# Patient Record
Sex: MALE | Race: WHITE | Employment: FULL TIME | URBAN - METROPOLITAN AREA
[De-identification: names, ages, dates, MRNs, and addresses within clinical notes are randomized per-mention and may not be internally consistent; named-entity substitution may affect disease eponyms.]

---

## 2017-01-09 ENCOUNTER — TELEPHONE (OUTPATIENT)
Dept: FAMILY MEDICINE CLINIC | Age: 52
End: 2017-01-09

## 2017-01-12 ENCOUNTER — TELEPHONE (OUTPATIENT)
Dept: FAMILY MEDICINE CLINIC | Age: 52
End: 2017-01-12

## 2017-06-13 ENCOUNTER — TELEPHONE (OUTPATIENT)
Dept: FAMILY MEDICINE CLINIC | Age: 52
End: 2017-06-13

## 2017-06-15 LAB
CHOLESTEROL, TOTAL: 218 MG/DL
CHOLESTEROL/HDL RATIO: NORMAL
HDLC SERPL-MCNC: 45 MG/DL (ref 35–70)
LDL CHOLESTEROL CALCULATED: 114 MG/DL (ref 0–160)
TRIGL SERPL-MCNC: 296 MG/DL
VLDLC SERPL CALC-MCNC: 59 MG/DL

## 2017-06-20 ENCOUNTER — OFFICE VISIT (OUTPATIENT)
Dept: FAMILY MEDICINE CLINIC | Age: 52
End: 2017-06-20

## 2017-06-20 VITALS
HEIGHT: 70 IN | BODY MASS INDEX: 31.7 KG/M2 | HEART RATE: 68 BPM | DIASTOLIC BLOOD PRESSURE: 70 MMHG | SYSTOLIC BLOOD PRESSURE: 124 MMHG | RESPIRATION RATE: 16 BRPM | WEIGHT: 221.4 LBS

## 2017-06-20 DIAGNOSIS — N52.9 ERECTILE DYSFUNCTION, UNSPECIFIED ERECTILE DYSFUNCTION TYPE: ICD-10-CM

## 2017-06-20 DIAGNOSIS — E66.9 OBESITY, UNSPECIFIED OBESITY SEVERITY, UNSPECIFIED OBESITY TYPE: ICD-10-CM

## 2017-06-20 DIAGNOSIS — Z80.42 FAMILY HISTORY OF PROSTATE CANCER: ICD-10-CM

## 2017-06-20 DIAGNOSIS — E78.5 HYPERLIPIDEMIA, UNSPECIFIED HYPERLIPIDEMIA TYPE: Primary | ICD-10-CM

## 2017-06-20 DIAGNOSIS — Z12.11 SCREEN FOR COLON CANCER: ICD-10-CM

## 2017-06-20 PROCEDURE — 99214 OFFICE O/P EST MOD 30 MIN: CPT | Performed by: FAMILY MEDICINE

## 2017-06-20 RX ORDER — SILDENAFIL CITRATE 20 MG/1
20 TABLET ORAL PRN
Qty: 30 TABLET | Refills: 5 | Status: SHIPPED | OUTPATIENT
Start: 2017-06-20 | End: 2018-06-15 | Stop reason: SDUPTHER

## 2017-06-20 ASSESSMENT — ENCOUNTER SYMPTOMS
RESPIRATORY NEGATIVE: 1
GASTROINTESTINAL NEGATIVE: 1

## 2018-06-04 ENCOUNTER — TELEPHONE (OUTPATIENT)
Dept: FAMILY MEDICINE CLINIC | Age: 53
End: 2018-06-04

## 2018-06-04 DIAGNOSIS — Z00.00 WELL ADULT EXAM: Primary | ICD-10-CM

## 2018-06-12 LAB
AVERAGE GLUCOSE: NORMAL
CHOLESTEROL, TOTAL: 262 MG/DL
CHOLESTEROL/HDL RATIO: NORMAL
HBA1C MFR BLD: 5.4 %
HDLC SERPL-MCNC: 39 MG/DL (ref 35–70)
LDL CHOLESTEROL CALCULATED: NORMAL MG/DL (ref 0–160)
TRIGL SERPL-MCNC: 621 MG/DL
VLDLC SERPL CALC-MCNC: NORMAL MG/DL

## 2018-06-15 ENCOUNTER — OFFICE VISIT (OUTPATIENT)
Dept: FAMILY MEDICINE CLINIC | Age: 53
End: 2018-06-15
Payer: COMMERCIAL

## 2018-06-15 VITALS
HEIGHT: 70 IN | RESPIRATION RATE: 14 BRPM | DIASTOLIC BLOOD PRESSURE: 88 MMHG | BODY MASS INDEX: 31.64 KG/M2 | WEIGHT: 221 LBS | OXYGEN SATURATION: 98 % | HEART RATE: 84 BPM | SYSTOLIC BLOOD PRESSURE: 138 MMHG

## 2018-06-15 DIAGNOSIS — I10 ESSENTIAL HYPERTENSION: Primary | ICD-10-CM

## 2018-06-15 DIAGNOSIS — N52.9 ERECTILE DYSFUNCTION, UNSPECIFIED ERECTILE DYSFUNCTION TYPE: ICD-10-CM

## 2018-06-15 DIAGNOSIS — E66.9 OBESITY (BMI 30.0-34.9): ICD-10-CM

## 2018-06-15 DIAGNOSIS — Z80.42 FAMILY HISTORY OF PROSTATE CANCER: ICD-10-CM

## 2018-06-15 DIAGNOSIS — E78.49 OTHER HYPERLIPIDEMIA: ICD-10-CM

## 2018-06-15 PROCEDURE — 99214 OFFICE O/P EST MOD 30 MIN: CPT | Performed by: FAMILY MEDICINE

## 2018-06-15 RX ORDER — SILDENAFIL CITRATE 20 MG/1
20 TABLET ORAL PRN
Qty: 30 TABLET | Refills: 5 | Status: SHIPPED | OUTPATIENT
Start: 2018-06-15 | End: 2018-12-17 | Stop reason: SDUPTHER

## 2018-06-15 ASSESSMENT — PATIENT HEALTH QUESTIONNAIRE - PHQ9
2. FEELING DOWN, DEPRESSED OR HOPELESS: 0
1. LITTLE INTEREST OR PLEASURE IN DOING THINGS: 0
SUM OF ALL RESPONSES TO PHQ QUESTIONS 1-9: 0
SUM OF ALL RESPONSES TO PHQ9 QUESTIONS 1 & 2: 0

## 2018-06-15 ASSESSMENT — ENCOUNTER SYMPTOMS
GASTROINTESTINAL NEGATIVE: 1
RESPIRATORY NEGATIVE: 1

## 2018-12-11 LAB
CHOLESTEROL, TOTAL: 233 MG/DL
CHOLESTEROL/HDL RATIO: NORMAL
HDLC SERPL-MCNC: 50 MG/DL (ref 35–70)
LDL CHOLESTEROL CALCULATED: 145 MG/DL (ref 0–160)
TRIGL SERPL-MCNC: 188 MG/DL
VLDLC SERPL CALC-MCNC: 38 MG/DL

## 2018-12-17 ENCOUNTER — OFFICE VISIT (OUTPATIENT)
Dept: FAMILY MEDICINE CLINIC | Age: 53
End: 2018-12-17
Payer: COMMERCIAL

## 2018-12-17 VITALS
BODY MASS INDEX: 32.97 KG/M2 | SYSTOLIC BLOOD PRESSURE: 122 MMHG | HEIGHT: 69 IN | RESPIRATION RATE: 16 BRPM | HEART RATE: 84 BPM | DIASTOLIC BLOOD PRESSURE: 84 MMHG | WEIGHT: 222.6 LBS

## 2018-12-17 DIAGNOSIS — R73.01 IFG (IMPAIRED FASTING GLUCOSE): ICD-10-CM

## 2018-12-17 DIAGNOSIS — E66.9 OBESITY (BMI 30-39.9): ICD-10-CM

## 2018-12-17 DIAGNOSIS — N52.9 ERECTILE DYSFUNCTION, UNSPECIFIED ERECTILE DYSFUNCTION TYPE: ICD-10-CM

## 2018-12-17 DIAGNOSIS — Z80.42 FAMILY HISTORY OF PROSTATE CANCER: ICD-10-CM

## 2018-12-17 DIAGNOSIS — E78.00 PURE HYPERCHOLESTEROLEMIA: Primary | ICD-10-CM

## 2018-12-17 PROCEDURE — 99214 OFFICE O/P EST MOD 30 MIN: CPT | Performed by: FAMILY MEDICINE

## 2018-12-17 RX ORDER — SILDENAFIL CITRATE 20 MG/1
20 TABLET ORAL PRN
Qty: 30 TABLET | Refills: 5 | Status: SHIPPED | OUTPATIENT
Start: 2018-12-17 | End: 2019-06-17 | Stop reason: SDUPTHER

## 2018-12-17 ASSESSMENT — ENCOUNTER SYMPTOMS
GASTROINTESTINAL NEGATIVE: 1
RESPIRATORY NEGATIVE: 1

## 2018-12-17 NOTE — PROGRESS NOTES
Chronic Disease Visit Information    BP Readings from Last 3 Encounters:   06/15/18 138/88   07/11/17 134/76   06/20/17 124/70          Hemoglobin A1C (%)   Date Value   06/12/2018 5.4   12/15/2016 5.6   10/28/2015 5.7     LDL Calculated (mg/dL)   Date Value   12/11/2018 145     HDL   Date Value   12/11/2018 50 mg/dL   12/05/2011 48 mg/dl     BUN (mg/dl)   Date Value   01/25/2016 16     CREATININE (mg/dl)   Date Value   01/25/2016 0.9     Glucose (mg/dl)   Date Value   01/25/2016 96            Have you changed or started any medications since your last visit including any over-the-counter medicines, vitamins, or herbal medicines? no   Are you having any side effects from any of your medications? -  no  Have you stopped taking any of your medications? Is so, why? -  no    Have you seen any other physician or provider since your last visit? No  Have you had any other diagnostic tests since your last visit? Yes - Records Obtained  Have you been seen in the emergency room and/or had an admission to a hospital since we last saw you? No  Have you had your annual diabetic retinal (eye) exam? No  Have you had your routine dental cleaning in the past 6 months? no    Have you activated your [employer] account? If not, what are your barriers?  Yes     Patient Care Team:  Ann-Marie Cano DO as PCP - General (Family Medicine)  Ann-Marie Cano DO as PCP - [facility] Attributed Provider         Medical History Review  Past Medical, Family, and Social History reviewed and does contribute to the patient presenting condition    Health Maintenance   Topic Date Due    Hepatitis C screen  1965    HIV screen  05/03/1980    DTaP/Tdap/Td vaccine (1 - Tdap) 05/03/1984    Shingles Vaccine (1 of 2 - 2 Dose Series) 05/03/2015    Flu vaccine (1) 09/01/2018    Lipid screen  12/11/2023    Colon cancer screen colonoscopy  08/04/2027

## 2018-12-17 NOTE — PROGRESS NOTES
WBC 6.1 01/25/2016    HGB 14.1 01/25/2016    HCT 41.0 (L) 01/25/2016    MCV 84.4 01/25/2016     01/25/2016         Health Maintenance   Topic Date Due    Hepatitis C screen  1965    HIV screen  05/03/1980    DTaP/Tdap/Td vaccine (1 - Tdap) 05/03/1984    Shingles Vaccine (1 of 2 - 2 Dose Series) 05/03/2015    Flu vaccine (1) 09/01/2018    Lipid screen  12/11/2023    Colon cancer screen colonoscopy  08/04/2027         There is no immunization history on file for this patient. Review of Systems   Constitutional: Negative. HENT: Negative. Respiratory: Negative. Cardiovascular: Negative. Gastrointestinal: Negative. Musculoskeletal: Negative. All other systems reviewed and are negative. Objective:   Physical Exam   Constitutional: He is oriented to person, place, and time. He appears well-developed and well-nourished. HENT:   Head: Normocephalic and atraumatic. Right Ear: Tympanic membrane normal.   Left Ear: Tympanic membrane normal.   Mouth/Throat: Oropharynx is clear and moist and mucous membranes are normal.   Neck: Carotid bruit is not present. Cardiovascular: Normal rate, regular rhythm and normal heart sounds. No murmur heard. Pulmonary/Chest: Effort normal and breath sounds normal.   Abdominal: Soft. Bowel sounds are normal.   Musculoskeletal: He exhibits no edema. Neurological: He is alert and oriented to person, place, and time. Skin: Skin is warm and dry. Psychiatric: He has a normal mood and affect. His behavior is normal.   Nursing note and vitals reviewed. Assessment:       Diagnosis Orders   1. Pure hypercholesterolemia  Lipid Panel    Comprehensive Metabolic Panel    TSH with Reflex   2. Erectile dysfunction, unspecified erectile dysfunction type  sildenafil (REVATIO) 20 MG tablet   3. Obesity (BMI 30-39.9)     4. IFG (impaired fasting glucose)  Comprehensive Metabolic Panel    Hemoglobin A1C   5.  Family history of prostate cancer PSA, Prostatic Specific Antigen           Plan:      -  Chronic medical problems stable  -  Continue current medications  -  Labs reviewed, cholesterol has improved with dietary changes alone.   Will hold off on medication for now  -  Recheck labs 6 mos  -  RTO 6 mos        Gonzalo Clay DO

## 2019-06-11 ENCOUNTER — TELEPHONE (OUTPATIENT)
Dept: FAMILY MEDICINE CLINIC | Age: 54
End: 2019-06-11

## 2019-06-11 NOTE — TELEPHONE ENCOUNTER
Spoke with pt regarding his upcoming appt on 6/17/19 at 8:30am.  Confirmed appt and reminded pt to get labs done.

## 2019-06-12 LAB
AVERAGE GLUCOSE: NORMAL
CHOLESTEROL, TOTAL: 231 MG/DL
CHOLESTEROL/HDL RATIO: NORMAL
HBA1C MFR BLD: 5.5 %
HDLC SERPL-MCNC: 45 MG/DL (ref 35–70)
LDL CHOLESTEROL CALCULATED: 130 MG/DL (ref 0–160)
TRIGL SERPL-MCNC: 280 MG/DL
TSH SERPL DL<=0.05 MIU/L-ACNC: 1.92 UIU/ML
VLDLC SERPL CALC-MCNC: 56 MG/DL

## 2019-06-17 ENCOUNTER — OFFICE VISIT (OUTPATIENT)
Dept: FAMILY MEDICINE CLINIC | Age: 54
End: 2019-06-17
Payer: COMMERCIAL

## 2019-06-17 VITALS
SYSTOLIC BLOOD PRESSURE: 126 MMHG | BODY MASS INDEX: 30.48 KG/M2 | WEIGHT: 206.4 LBS | HEART RATE: 72 BPM | OXYGEN SATURATION: 99 % | RESPIRATION RATE: 12 BRPM | DIASTOLIC BLOOD PRESSURE: 84 MMHG

## 2019-06-17 DIAGNOSIS — E78.00 PURE HYPERCHOLESTEROLEMIA: ICD-10-CM

## 2019-06-17 DIAGNOSIS — E66.9 OBESITY (BMI 30-39.9): ICD-10-CM

## 2019-06-17 DIAGNOSIS — M72.0 DUPUYTREN CONTRACTURE: Primary | ICD-10-CM

## 2019-06-17 DIAGNOSIS — R73.01 IFG (IMPAIRED FASTING GLUCOSE): ICD-10-CM

## 2019-06-17 DIAGNOSIS — N52.9 ERECTILE DYSFUNCTION, UNSPECIFIED ERECTILE DYSFUNCTION TYPE: ICD-10-CM

## 2019-06-17 PROCEDURE — 99214 OFFICE O/P EST MOD 30 MIN: CPT | Performed by: FAMILY MEDICINE

## 2019-06-17 RX ORDER — SILDENAFIL CITRATE 20 MG/1
20 TABLET ORAL PRN
Qty: 30 TABLET | Refills: 5 | Status: SHIPPED | OUTPATIENT
Start: 2019-06-17 | End: 2019-12-17

## 2019-06-17 ASSESSMENT — PATIENT HEALTH QUESTIONNAIRE - PHQ9
SUM OF ALL RESPONSES TO PHQ QUESTIONS 1-9: 0
1. LITTLE INTEREST OR PLEASURE IN DOING THINGS: 0
2. FEELING DOWN, DEPRESSED OR HOPELESS: 0
SUM OF ALL RESPONSES TO PHQ QUESTIONS 1-9: 0
SUM OF ALL RESPONSES TO PHQ9 QUESTIONS 1 & 2: 0

## 2019-06-17 ASSESSMENT — ENCOUNTER SYMPTOMS
GASTROINTESTINAL NEGATIVE: 1
RESPIRATORY NEGATIVE: 1

## 2019-06-17 NOTE — PROGRESS NOTES
Visit Information    Have you changed or started any medications since your last visit including any over-the-counter medicines, vitamins, or herbal medicines? no   Are you having any side effects from any of your medications? -  no  Have you stopped taking any of your medications? Is so, why? -  no    Have you seen any other physician or provider since your last visit? No  Have you had any other diagnostic tests since your last visit? Yes - Records Obtained  Have you been seen in the emergency room and/or had an admission to a hospital since we last saw you? No  Have you activated your [employer] account? If not, what are your barriers?  Yes     Patient Care Team:  Venkata Solorzano DO as PCP - General (Family Medicine)  Venkata Solorzano DO as PCP - [facility]    Medical History Review  Past Medical, Family, and Social History reviewed and does contribute to the patient presenting condition    Health Maintenance   Topic Date Due    Hepatitis C screen  1965    HIV screen  05/03/1980    DTaP/Tdap/Td vaccine (1 - Tdap) 05/03/1984    Shingles Vaccine (1 of 2) 05/03/2015    Flu vaccine (Season Ended) 09/01/2019    Diabetes screen  06/12/2022    Lipid screen  06/12/2024    Colon cancer screen colonoscopy  08/04/2027    Pneumococcal 0-64 years Vaccine  Aged Out

## 2019-06-17 NOTE — PROGRESS NOTES
Subjective:      Patient ID: Eron Henao is a 47 y.o. male. HPI:    Chief Complaint   Patient presents with    6 Month Follow-Up     hyperlipidemia    Dysmenorrhea     left pinky     6 month eval.  Doing well overall. Pt c/o left pinky starting to curl up. This has progressed some since last visit. BP well controlled. BP Readings from Last 3 Encounters:   06/17/19 126/84   12/17/18 122/84   06/15/18 138/88     Weight down 15 lbs since last visit. Eating healthy and exercising on regular basis. Wt Readings from Last 3 Encounters:   06/17/19 206 lb 6.4 oz (93.6 kg)   12/17/18 222 lb 9.6 oz (101 kg)   06/15/18 221 lb (100.2 kg)       Patient Active Problem List   Diagnosis    Hyperlipidemia    HTN (hypertension)    Obesity    ED (erectile dysfunction)    Family history of prostate cancer     Past Surgical History:   Procedure Laterality Date    WISDOM TOOTH EXTRACTION  08/2018     Prior to Admission medications    Medication Sig Start Date End Date Taking?  Authorizing Provider   sildenafil (REVATIO) 20 MG tablet Take 1 tablet by mouth as needed (ED) 12/17/18  Yes Panchito Longer, DO       Lab Results   Component Value Date    LABA1C 5.5 06/12/2019     No results found for: EAG    No components found for: CHLPL  Lab Results   Component Value Date    TRIG 280 06/12/2019    TRIG 188 12/11/2018    TRIG 621 06/12/2018     Lab Results   Component Value Date    HDL 45 06/12/2019    HDL 50 12/11/2018    HDL 39 06/12/2018     Lab Results   Component Value Date    LDLCALC 130 06/12/2019    LDLCALC 145 12/11/2018    LDLCALC 114 06/15/2017     No results found for: LABVLDL      Chemistry        Component Value Date/Time     01/25/2016 1111    K 4.8 01/25/2016 1111    CL 96 (L) 01/25/2016 1111    CO2 28 01/25/2016 1111    BUN 16 01/25/2016 1111    CREATININE 0.9 01/25/2016 1111        Component Value Date/Time    CALCIUM 9.4 01/25/2016 1111    ALKPHOS 100 01/25/2016 1111    AST 31 01/25/2016 1111    ALT 48 01/25/2016 1111    BILITOT 0.6 01/25/2016 1111            Lab Results   Component Value Date    TSH 1.920 06/12/2019       Lab Results   Component Value Date    WBC 6.1 01/25/2016    HGB 14.1 01/25/2016    HCT 41.0 (L) 01/25/2016    MCV 84.4 01/25/2016     01/25/2016         Health Maintenance   Topic Date Due    Hepatitis C screen  1965    HIV screen  05/03/1980    DTaP/Tdap/Td vaccine (1 - Tdap) 05/03/1984    Shingles Vaccine (1 of 2) 05/03/2015    Flu vaccine (Season Ended) 09/01/2019    Diabetes screen  06/12/2022    Lipid screen  06/12/2024    Colon cancer screen colonoscopy  08/04/2027    Pneumococcal 0-64 years Vaccine  Aged Out         There is no immunization history on file for this patient. Review of Systems   Constitutional: Negative. HENT: Negative. Respiratory: Negative. Cardiovascular: Negative. Gastrointestinal: Negative. Musculoskeletal: Positive for arthralgias (left hand pain). All other systems reviewed and are negative. Objective:   Physical Exam   Constitutional: He is oriented to person, place, and time. He appears well-developed and well-nourished. HENT:   Head: Normocephalic and atraumatic. Right Ear: Tympanic membrane normal.   Left Ear: Tympanic membrane normal.   Mouth/Throat: Oropharynx is clear and moist and mucous membranes are normal.   Neck: Carotid bruit is not present. Cardiovascular: Normal rate, regular rhythm and normal heart sounds. No murmur heard. Pulmonary/Chest: Effort normal and breath sounds normal.   Abdominal: Soft. Bowel sounds are normal.   Musculoskeletal: He exhibits no edema. Hands:  Neurological: He is alert and oriented to person, place, and time. Skin: Skin is warm and dry. Psychiatric: He has a normal mood and affect. His behavior is normal.   Nursing note and vitals reviewed. Assessment:       Diagnosis Orders   1. Dupuytren contracture     2.  Erectile dysfunction, unspecified erectile dysfunction type  sildenafil (REVATIO) 20 MG tablet   3. Pure hypercholesterolemia  Lipid Panel   4. IFG (impaired fasting glucose)     5. Obesity (BMI 30-39. 9)           Plan:      -  Chronic medical problems stable  -  Continue current medications  -  Labs reviewed, look fine overall  -  Declines referral for #1, continue home exercises  -  Recheck lipid panel in 6 mos at his request  -  RTO 6 mos        Meg Rubinstein, DO

## 2019-09-12 ENCOUNTER — OFFICE VISIT (OUTPATIENT)
Dept: FAMILY MEDICINE CLINIC | Age: 54
End: 2019-09-12
Payer: COMMERCIAL

## 2019-09-12 VITALS
WEIGHT: 206.2 LBS | RESPIRATION RATE: 15 BRPM | OXYGEN SATURATION: 98 % | BODY MASS INDEX: 30.45 KG/M2 | SYSTOLIC BLOOD PRESSURE: 120 MMHG | HEART RATE: 81 BPM | DIASTOLIC BLOOD PRESSURE: 80 MMHG

## 2019-09-12 DIAGNOSIS — M77.11 LATERAL EPICONDYLITIS OF RIGHT ELBOW: Primary | ICD-10-CM

## 2019-09-12 PROCEDURE — 99213 OFFICE O/P EST LOW 20 MIN: CPT | Performed by: FAMILY MEDICINE

## 2019-09-12 RX ORDER — PREDNISONE 20 MG/1
TABLET ORAL
Qty: 25 TABLET | Refills: 0 | Status: SHIPPED | OUTPATIENT
Start: 2019-09-12 | End: 2019-10-10

## 2019-09-12 ASSESSMENT — ENCOUNTER SYMPTOMS
GASTROINTESTINAL NEGATIVE: 1
RESPIRATORY NEGATIVE: 1

## 2019-09-12 NOTE — PATIENT INSTRUCTIONS
You may receive a survey regarding the care you received during your visit. Your input is valuable to us. We encourage you to complete and return your survey. We hope you will choose us in the future for your healthcare needs. Patient Education        Tennis Elbow: Care Instructions  Your Care Instructions    Tennis elbow is soreness or pain on the outer part of the elbow. The pain occurs when the tendon is stretched and becomes irritated by repeated twisting of the hand, wrist, and forearm. A tendon is a tough tissue that connects muscle to bone. This injury is common in tennis players. But you also can get it from many activities that work the same muscles. Examples include gardening, painting, and using tools. Tennis elbow usually heals with rest and treatment at home. Follow-up care is a key part of your treatment and safety. Be sure to make and go to all appointments, and call your doctor if you are having problems. It's also a good idea to know your test results and keep a list of the medicines you take. How can you care for yourself at home?    · Rest your fingers, wrist, and forearm. Try to stop or reduce any activity that causes elbow pain. You may have to rest your arm for weeks to months. Follow your doctor's directions for how long to rest.     · Put ice or a cold pack on your elbow for 10 to 20 minutes at a time. Try to do this every 1 to 2 hours for the next 3 days (when you are awake) or until the swelling goes down. Put a thin cloth between the ice and your skin.     · If your doctor gave you a brace or splint, use it as directed. A \"counterforce\" brace is a strap around your forearm, just below your elbow.  It may ease the pressure on the tendon and spread force throughout your arm.     · Prop up your elbow on pillows to help reduce swelling.     · Follow your doctor's or physical therapist's directions for exercise.     · Return to your usual activities slowly.     · Try to prevent the problem. Learn the best techniques for your sport. For example, make sure the  on your tennis racquet is not too big for your hand. Try not to hit a tennis ball late in your swing.     · Think about asking your employer about new ways of doing your job if your elbow pain is caused by something you do at work. Medicines    · Be safe with medicines. Read and follow all instructions on the label. ? If the doctor gave you a prescription medicine for pain, take it as prescribed. ? If you are not taking a prescription pain medicine, ask your doctor if you can take an over-the-counter medicine. When should you call for help? Call your doctor now or seek immediate medical care if:    · Your pain is worse.     · You cannot bend your elbow normally.     · Your arm or hand is cool or pale or changes color.     · You have tingling, weakness, or numbness in your hand and fingers.    Watch closely for changes in your health, and be sure to contact your doctor if:    · You have work problems caused by your elbow pain.     · Your pain is not better after 2 weeks. Where can you learn more? Go to [email or web address] and sign in to your [employer] account. Enter [id number] in the [facility] box to learn more about \"Tennis Elbow: Care Instructions. \"     If you do not have an account, please click on the \"Sign Up Now\" link. Current as of: September 20, 2018  Content Version: 12.1  © 3296-8370 [employer]. Care instructions adapted under license by [facility] ([facility]). If you have questions about a medical condition or this instruction, always ask your healthcare professional. [place] [address] any warranty or liability for your use of this information. Patient Education        Tennis Elbow: Exercises  Your Care Instructions  Here are some examples of typical rehabilitation exercises for your condition. Start each exercise slowly.  Ease off the exercise if you start

## 2019-09-12 NOTE — PROGRESS NOTES
Visit Information    Have you changed or started any medications since your last visit including any over-the-counter medicines, vitamins, or herbal medicines? no   Are you having any side effects from any of your medications? -  no  Have you stopped taking any of your medications? Is so, why? -  no    Have you seen any other physician or provider since your last visit? No  Have you had any other diagnostic tests since your last visit? No  Have you been seen in the emergency room and/or had an admission to a hospital since we last saw you? No  Have you had your routine dental cleaning in the past 6 months? na    Have you activated your [employer] account? If not, what are your barriers?  Yes     Patient Care Team:  Héctor Pickett DO as PCP - General (Family Medicine)  Héctor Pickett DO as PCP - [facility]    Medical History Review  Past Medical, Family, and Social History reviewed and does not contribute to the patient presenting condition    Health Maintenance   Topic Date Due    Hepatitis C screen  1965    HIV screen  05/03/1980    DTaP/Tdap/Td vaccine (1 - Tdap) 05/03/1984    Shingles Vaccine (1 of 2) 05/03/2015    Flu vaccine (1) 09/01/2019    Diabetes screen  06/12/2022    Lipid screen  06/12/2024    Colon cancer screen colonoscopy  08/04/2027    Pneumococcal 0-64 years Vaccine  Aged Out

## 2019-10-10 ENCOUNTER — OFFICE VISIT (OUTPATIENT)
Dept: FAMILY MEDICINE CLINIC | Age: 54
End: 2019-10-10
Payer: COMMERCIAL

## 2019-10-10 VITALS
DIASTOLIC BLOOD PRESSURE: 76 MMHG | SYSTOLIC BLOOD PRESSURE: 108 MMHG | HEART RATE: 80 BPM | RESPIRATION RATE: 18 BRPM | WEIGHT: 211.1 LBS | BODY MASS INDEX: 31.17 KG/M2

## 2019-10-10 DIAGNOSIS — M77.11 LATERAL EPICONDYLITIS OF RIGHT ELBOW: Primary | ICD-10-CM

## 2019-10-10 PROCEDURE — 99213 OFFICE O/P EST LOW 20 MIN: CPT | Performed by: FAMILY MEDICINE

## 2019-10-10 ASSESSMENT — ENCOUNTER SYMPTOMS
GASTROINTESTINAL NEGATIVE: 1
RESPIRATORY NEGATIVE: 1

## 2019-12-05 ENCOUNTER — TELEPHONE (OUTPATIENT)
Dept: FAMILY MEDICINE CLINIC | Age: 54
End: 2019-12-05

## 2019-12-10 ENCOUNTER — TELEPHONE (OUTPATIENT)
Dept: FAMILY MEDICINE CLINIC | Age: 54
End: 2019-12-10

## 2019-12-12 LAB
CHOLESTEROL, TOTAL: 270 MG/DL
CHOLESTEROL/HDL RATIO: NORMAL
HDLC SERPL-MCNC: 51 MG/DL (ref 35–70)
LDL CHOLESTEROL CALCULATED: 156 MG/DL (ref 0–160)
TRIGL SERPL-MCNC: 313 MG/DL
VLDLC SERPL CALC-MCNC: 63 MG/DL

## 2019-12-16 RX ORDER — MELOXICAM 15 MG/1
TABLET ORAL
Refills: 0 | COMMUNITY
Start: 2019-10-14 | End: 2020-07-09 | Stop reason: ALTCHOICE

## 2019-12-17 ENCOUNTER — OFFICE VISIT (OUTPATIENT)
Dept: FAMILY MEDICINE CLINIC | Age: 54
End: 2019-12-17
Payer: COMMERCIAL

## 2019-12-17 VITALS
SYSTOLIC BLOOD PRESSURE: 120 MMHG | RESPIRATION RATE: 14 BRPM | BODY MASS INDEX: 30.75 KG/M2 | WEIGHT: 214.8 LBS | HEART RATE: 78 BPM | HEIGHT: 70 IN | DIASTOLIC BLOOD PRESSURE: 78 MMHG | OXYGEN SATURATION: 98 %

## 2019-12-17 DIAGNOSIS — M77.11 LATERAL EPICONDYLITIS OF RIGHT ELBOW: ICD-10-CM

## 2019-12-17 DIAGNOSIS — E78.00 PURE HYPERCHOLESTEROLEMIA: ICD-10-CM

## 2019-12-17 DIAGNOSIS — I10 ESSENTIAL HYPERTENSION: ICD-10-CM

## 2019-12-17 DIAGNOSIS — Z12.5 SCREENING FOR PROSTATE CANCER: ICD-10-CM

## 2019-12-17 DIAGNOSIS — M72.0 DUPUYTREN CONTRACTURE: ICD-10-CM

## 2019-12-17 DIAGNOSIS — R73.01 IFG (IMPAIRED FASTING GLUCOSE): Primary | ICD-10-CM

## 2019-12-17 DIAGNOSIS — Z98.890 S/P CARPAL TUNNEL RELEASE: ICD-10-CM

## 2019-12-17 DIAGNOSIS — N52.9 ERECTILE DYSFUNCTION, UNSPECIFIED ERECTILE DYSFUNCTION TYPE: ICD-10-CM

## 2019-12-17 PROCEDURE — 99214 OFFICE O/P EST MOD 30 MIN: CPT | Performed by: FAMILY MEDICINE

## 2019-12-17 RX ORDER — SILDENAFIL 50 MG/1
50 TABLET, FILM COATED ORAL DAILY PRN
Qty: 30 TABLET | Refills: 5 | Status: SHIPPED | OUTPATIENT
Start: 2019-12-17 | End: 2020-07-09 | Stop reason: SDUPTHER

## 2019-12-17 RX ORDER — AMLODIPINE BESYLATE 5 MG/1
TABLET ORAL
Qty: 90 TABLET | Refills: 3 | Status: SHIPPED | OUTPATIENT
Start: 2019-12-17 | End: 2020-07-09 | Stop reason: ALTCHOICE

## 2019-12-17 SDOH — ECONOMIC STABILITY: TRANSPORTATION INSECURITY
IN THE PAST 12 MONTHS, HAS THE LACK OF TRANSPORTATION KEPT YOU FROM MEDICAL APPOINTMENTS OR FROM GETTING MEDICATIONS?: NO

## 2019-12-17 SDOH — ECONOMIC STABILITY: FOOD INSECURITY: WITHIN THE PAST 12 MONTHS, THE FOOD YOU BOUGHT JUST DIDN'T LAST AND YOU DIDN'T HAVE MONEY TO GET MORE.: NEVER TRUE

## 2019-12-17 SDOH — ECONOMIC STABILITY: TRANSPORTATION INSECURITY
IN THE PAST 12 MONTHS, HAS LACK OF TRANSPORTATION KEPT YOU FROM MEETINGS, WORK, OR FROM GETTING THINGS NEEDED FOR DAILY LIVING?: NO

## 2019-12-17 SDOH — ECONOMIC STABILITY: FOOD INSECURITY: WITHIN THE PAST 12 MONTHS, YOU WORRIED THAT YOUR FOOD WOULD RUN OUT BEFORE YOU GOT MONEY TO BUY MORE.: NEVER TRUE

## 2019-12-17 SDOH — ECONOMIC STABILITY: INCOME INSECURITY: HOW HARD IS IT FOR YOU TO PAY FOR THE VERY BASICS LIKE FOOD, HOUSING, MEDICAL CARE, AND HEATING?: NOT HARD AT ALL

## 2019-12-17 ASSESSMENT — ENCOUNTER SYMPTOMS
GASTROINTESTINAL NEGATIVE: 1
RESPIRATORY NEGATIVE: 1

## 2020-06-09 ENCOUNTER — TELEPHONE (OUTPATIENT)
Dept: FAMILY MEDICINE CLINIC | Age: 55
End: 2020-06-09

## 2020-07-07 LAB
AVERAGE GLUCOSE: NORMAL
CHOLESTEROL, TOTAL: 273 MG/DL
CHOLESTEROL/HDL RATIO: ABNORMAL
HBA1C MFR BLD: 5.6 %
HDLC SERPL-MCNC: 59 MG/DL (ref 35–70)
LDL CHOLESTEROL CALCULATED: 169 MG/DL (ref 0–160)
TRIGL SERPL-MCNC: 225 MG/DL
TSH SERPL DL<=0.05 MIU/L-ACNC: 2.11 UIU/ML
VLDLC SERPL CALC-MCNC: 45 MG/DL

## 2020-07-09 ENCOUNTER — OFFICE VISIT (OUTPATIENT)
Dept: FAMILY MEDICINE CLINIC | Age: 55
End: 2020-07-09
Payer: COMMERCIAL

## 2020-07-09 VITALS
BODY MASS INDEX: 30.08 KG/M2 | RESPIRATION RATE: 18 BRPM | SYSTOLIC BLOOD PRESSURE: 140 MMHG | OXYGEN SATURATION: 99 % | DIASTOLIC BLOOD PRESSURE: 78 MMHG | HEART RATE: 89 BPM | TEMPERATURE: 97.9 F | HEIGHT: 70 IN | WEIGHT: 210.1 LBS

## 2020-07-09 PROCEDURE — 99214 OFFICE O/P EST MOD 30 MIN: CPT | Performed by: FAMILY MEDICINE

## 2020-07-09 RX ORDER — ATORVASTATIN CALCIUM 20 MG/1
20 TABLET, FILM COATED ORAL NIGHTLY
Qty: 90 TABLET | Refills: 3 | Status: SHIPPED | OUTPATIENT
Start: 2020-07-09 | End: 2021-04-09 | Stop reason: SDUPTHER

## 2020-07-09 RX ORDER — SILDENAFIL 50 MG/1
50 TABLET, FILM COATED ORAL DAILY PRN
Qty: 30 TABLET | Refills: 5 | Status: SHIPPED | OUTPATIENT
Start: 2020-07-09 | End: 2021-04-09 | Stop reason: SDUPTHER

## 2020-07-09 RX ORDER — AMLODIPINE BESYLATE 5 MG/1
TABLET ORAL
Qty: 90 TABLET | Refills: 3
Start: 2020-07-09 | End: 2021-04-09 | Stop reason: SDUPTHER

## 2020-07-09 ASSESSMENT — PATIENT HEALTH QUESTIONNAIRE - PHQ9
2. FEELING DOWN, DEPRESSED OR HOPELESS: 0
SUM OF ALL RESPONSES TO PHQ QUESTIONS 1-9: 0
SUM OF ALL RESPONSES TO PHQ QUESTIONS 1-9: 0
SUM OF ALL RESPONSES TO PHQ9 QUESTIONS 1 & 2: 0
1. LITTLE INTEREST OR PLEASURE IN DOING THINGS: 0

## 2020-07-09 ASSESSMENT — ENCOUNTER SYMPTOMS
GASTROINTESTINAL NEGATIVE: 1
RESPIRATORY NEGATIVE: 1

## 2020-07-09 NOTE — PROGRESS NOTES
Subjective:      Patient ID: Tati Bennett is a 54 y.o. male. HPI:    Chief Complaint   Patient presents with    6 Month Follow-Up     bp f/u; never started taking norvasc- no symptoms     6 month eval.  Doing well overall. BP is elevated again, never started the amlodipine. BP Readings from Last 3 Encounters:   07/09/20 (!) 140/78   12/17/19 120/78   10/10/19 108/76     Weight is down 4 lbs since last visit. Wt Readings from Last 3 Encounters:   07/09/20 210 lb 1.6 oz (95.3 kg)   12/17/19 214 lb 12.8 oz (97.4 kg)   10/10/19 211 lb 1.6 oz (95.8 kg)     PSA elevated. Denies urinary symptoms. Dad and brother both with hx of prostate cancer, brother at age 36. LDL elevated again. Very little red meat. Eats pretty healthy. Patient Active Problem List   Diagnosis    Hyperlipidemia    HTN (hypertension)    Obesity    ED (erectile dysfunction)    Family history of prostate cancer     Past Surgical History:   Procedure Laterality Date    WISDOM TOOTH EXTRACTION  08/2018     Prior to Admission medications    Medication Sig Start Date End Date Taking?  Authorizing Provider   sildenafil (VIAGRA) 50 MG tablet Take 1 tablet by mouth daily as needed for Erectile Dysfunction 7/9/20  Yes Arthuro Goes, DO       Lab Results   Component Value Date    LABA1C 5.6 07/07/2020     No results found for: EAG    No components found for: CHLPL  Lab Results   Component Value Date    TRIG 225 07/07/2020    TRIG 313 12/12/2019    TRIG 280 06/12/2019     Lab Results   Component Value Date    HDL 59 07/07/2020    HDL 51 12/12/2019    HDL 45 06/12/2019     Lab Results   Component Value Date    LDLCALC 169 (A) 07/07/2020    LDLCALC 156 12/12/2019    LDLCALC 130 06/12/2019     No results found for: LABVLDL      Chemistry        Component Value Date/Time     01/25/2016 1111    K 4.8 01/25/2016 1111    CL 96 (L) 01/25/2016 1111    CO2 28 01/25/2016 1111    BUN 16 01/25/2016 1111    CREATININE 0.9 01/25/2016 1111 Component Value Date/Time    CALCIUM 9.4 01/25/2016 1111    ALKPHOS 100 01/25/2016 1111    AST 31 01/25/2016 1111    ALT 48 01/25/2016 1111    BILITOT 0.6 01/25/2016 1111            Lab Results   Component Value Date    TSH 2.110 07/07/2020       Lab Results   Component Value Date    WBC 6.1 01/25/2016    HGB 14.1 01/25/2016    HCT 41.0 (L) 01/25/2016    MCV 84.4 01/25/2016     01/25/2016         Health Maintenance   Topic Date Due    Hepatitis C screen  1965    HIV screen  05/03/1980    DTaP/Tdap/Td vaccine (1 - Tdap) 05/03/1984    Shingles Vaccine (1 of 2) 05/03/2015    Flu vaccine (1) 09/01/2020    Lipid screen  07/07/2021    Diabetes screen  07/07/2023    Colon cancer screen colonoscopy  08/04/2027    Hepatitis A vaccine  Aged Out    Hepatitis B vaccine  Aged Out    Hib vaccine  Aged Out    Meningococcal (ACWY) vaccine  Aged Out    Pneumococcal 0-64 years Vaccine  Aged Out         There is no immunization history on file for this patient. Review of Systems   Constitutional: Negative. HENT: Negative. Respiratory: Negative. Cardiovascular: Negative. Gastrointestinal: Negative. Musculoskeletal: Negative. All other systems reviewed and are negative. Objective:   Physical Exam  Vitals signs and nursing note reviewed. Constitutional:       Appearance: He is well-developed. HENT:      Head: Normocephalic and atraumatic. Right Ear: Tympanic membrane normal.      Left Ear: Tympanic membrane normal.   Neck:      Musculoskeletal: Neck supple. Vascular: No carotid bruit. Cardiovascular:      Rate and Rhythm: Normal rate and regular rhythm. Heart sounds: Normal heart sounds. No murmur. Pulmonary:      Effort: Pulmonary effort is normal.      Breath sounds: Normal breath sounds. Abdominal:      General: Bowel sounds are normal.      Palpations: Abdomen is soft. Skin:     General: Skin is warm and dry.    Neurological:      Mental Status: He is alert and oriented to person, place, and time. Psychiatric:         Behavior: Behavior normal.         Assessment:       Diagnosis Orders   1. Pure hypercholesterolemia  atorvastatin (LIPITOR) 20 MG tablet    Lipid Panel    AST    ALT   2. Essential hypertension  amLODIPine (NORVASC) 5 MG tablet   3. IFG (impaired fasting glucose)     4. Obesity (BMI 30-39.9)     5. Elevated PSA  PSA, Prostatic Specific Antigen    PSA, free   6.  Family history of prostate cancer             Plan:      -  Chronic medical problems stable  -  Labs reviewed, ok other than PSA and LDL  -  Start Norvasc for BP, Lipitor for LDL reduction  -  Strong famhx of prostate cancer, will recheck in 3 mos  -  RTO 3 mos        Marcio Skinner DO

## 2020-09-30 ENCOUNTER — TELEPHONE (OUTPATIENT)
Dept: FAMILY MEDICINE CLINIC | Age: 55
End: 2020-09-30

## 2020-09-30 NOTE — TELEPHONE ENCOUNTER
Called patient for pre visit planning, no answer and unable to leave a vm. Pt has appt on 10/9/2020 at 9am.  Please remind pt of appt date and time and check to see if they had their lab work done.

## 2020-09-30 NOTE — TELEPHONE ENCOUNTER
Pt was notified and voiced understanding. He lost his lab orders, but plans to go to the [facility] and will have them pull orders from [address] [place] [address] Rd.

## 2020-10-01 ENCOUNTER — NURSE ONLY (OUTPATIENT)
Dept: LAB | Age: 55
End: 2020-10-01

## 2020-10-01 LAB
ALBUMIN SERPL-MCNC: 4.8 G/DL (ref 3.5–5.1)
ALP BLD-CCNC: 85 U/L (ref 38–126)
ALT SERPL-CCNC: 24 U/L (ref 11–66)
ANION GAP SERPL CALCULATED.3IONS-SCNC: 14 MEQ/L (ref 8–16)
AST SERPL-CCNC: 20 U/L (ref 5–40)
AVERAGE GLUCOSE: 111 MG/DL (ref 70–126)
BASOPHILS # BLD: 0.2 %
BASOPHILS ABSOLUTE: 0 THOU/MM3 (ref 0–0.1)
BILIRUB SERPL-MCNC: 0.6 MG/DL (ref 0.3–1.2)
BUN BLDV-MCNC: 18 MG/DL (ref 7–22)
CALCIUM SERPL-MCNC: 10.3 MG/DL (ref 8.5–10.5)
CHLORIDE BLD-SCNC: 101 MEQ/L (ref 98–111)
CHOLESTEROL, TOTAL: 194 MG/DL (ref 100–199)
CO2: 26 MEQ/L (ref 23–33)
CREAT SERPL-MCNC: 0.8 MG/DL (ref 0.4–1.2)
EOSINOPHIL # BLD: 0.5 %
EOSINOPHILS ABSOLUTE: 0 THOU/MM3 (ref 0–0.4)
ERYTHROCYTE [DISTWIDTH] IN BLOOD BY AUTOMATED COUNT: 12.1 % (ref 11.5–14.5)
ERYTHROCYTE [DISTWIDTH] IN BLOOD BY AUTOMATED COUNT: 40.3 FL (ref 35–45)
GFR SERPL CREATININE-BSD FRML MDRD: > 90 ML/MIN/1.73M2
GLUCOSE BLD-MCNC: 91 MG/DL (ref 70–108)
HBA1C MFR BLD: 5.7 % (ref 4.4–6.4)
HCT VFR BLD CALC: 44.9 % (ref 42–52)
HDLC SERPL-MCNC: 58 MG/DL
HEMOGLOBIN: 14.7 GM/DL (ref 14–18)
IMMATURE GRANS (ABS): 0.02 THOU/MM3 (ref 0–0.07)
IMMATURE GRANULOCYTES: 0.2 %
LDL CHOLESTEROL CALCULATED: 91 MG/DL
LYMPHOCYTES # BLD: 25.9 %
LYMPHOCYTES ABSOLUTE: 2.2 THOU/MM3 (ref 1–4.8)
MCH RBC QN AUTO: 29.8 PG (ref 26–33)
MCHC RBC AUTO-ENTMCNC: 32.7 GM/DL (ref 32.2–35.5)
MCV RBC AUTO: 91.1 FL (ref 80–94)
MONOCYTES # BLD: 7.3 %
MONOCYTES ABSOLUTE: 0.6 THOU/MM3 (ref 0.4–1.3)
NUCLEATED RED BLOOD CELLS: 0 /100 WBC
PLATELET # BLD: 270 THOU/MM3 (ref 130–400)
PMV BLD AUTO: 10.5 FL (ref 9.4–12.4)
POTASSIUM SERPL-SCNC: 4.5 MEQ/L (ref 3.5–5.2)
PROSTATE SPECIFIC ANTIGEN: 2.76 NG/ML (ref 0–1)
RBC # BLD: 4.93 MILL/MM3 (ref 4.7–6.1)
SEG NEUTROPHILS: 65.9 %
SEGMENTED NEUTROPHILS ABSOLUTE COUNT: 5.6 THOU/MM3 (ref 1.8–7.7)
SODIUM BLD-SCNC: 141 MEQ/L (ref 135–145)
TOTAL PROTEIN: 8.1 G/DL (ref 6.1–8)
TRIGL SERPL-MCNC: 225 MG/DL (ref 0–199)
TSH SERPL DL<=0.05 MIU/L-ACNC: 1.48 UIU/ML (ref 0.4–4.2)
WBC # BLD: 8.5 THOU/MM3 (ref 4.8–10.8)

## 2020-10-09 ENCOUNTER — OFFICE VISIT (OUTPATIENT)
Dept: FAMILY MEDICINE CLINIC | Age: 55
End: 2020-10-09
Payer: COMMERCIAL

## 2020-10-09 VITALS
WEIGHT: 211 LBS | RESPIRATION RATE: 15 BRPM | BODY MASS INDEX: 30.71 KG/M2 | HEART RATE: 74 BPM | TEMPERATURE: 98 F | DIASTOLIC BLOOD PRESSURE: 68 MMHG | SYSTOLIC BLOOD PRESSURE: 124 MMHG

## 2020-10-09 PROCEDURE — 99214 OFFICE O/P EST MOD 30 MIN: CPT | Performed by: FAMILY MEDICINE

## 2020-10-09 ASSESSMENT — ENCOUNTER SYMPTOMS
RESPIRATORY NEGATIVE: 1
GASTROINTESTINAL NEGATIVE: 1

## 2020-10-09 NOTE — PROGRESS NOTES
Subjective:      Patient ID: Ilan Wilson is a 54 y.o. male. HPI:    Chief Complaint   Patient presents with    3 Month Follow-Up    Diabetes     2    Discuss Labs    Immunizations     declines flu vaccine today      3 month eval.  Doing well overall. BP very well controlled, tolerating the Norvasc. BP Readings from Last 3 Encounters:   10/09/20 124/68   07/09/20 (!) 140/78   12/17/19 120/78     Weight stable. Wt Readings from Last 3 Encounters:   10/09/20 211 lb (95.7 kg)   07/09/20 210 lb 1.6 oz (95.3 kg)   12/17/19 214 lb 12.8 oz (97.4 kg)     PSA has gone down from previous draw. Denies urinary symptoms. Does have famhx of prostate cancer and wishes to check again in 6 mos. Lab Results   Component Value Date    PSA 2.76 (H) 10/01/2020     No acute concerns for me today. Patient Active Problem List   Diagnosis    Hyperlipidemia    HTN (hypertension)    Obesity    ED (erectile dysfunction)    Family history of prostate cancer     Past Surgical History:   Procedure Laterality Date    WISDOM TOOTH EXTRACTION  08/2018     Prior to Admission medications    Medication Sig Start Date End Date Taking?  Authorizing Provider   sildenafil (VIAGRA) 50 MG tablet Take 1 tablet by mouth daily as needed for Erectile Dysfunction 7/9/20  Yes Handy Black DO   amLODIPine (NORVASC) 5 MG tablet TAKE 1 TABLET BY MOUTH DAILY 7/9/20  Yes Handy Black DO   atorvastatin (LIPITOR) 20 MG tablet Take 1 tablet by mouth nightly 7/9/20  Yes Handy Black DO       Lab Results   Component Value Date    LABA1C 5.7 10/01/2020     No results found for: EAG    No components found for: CHLPL  Lab Results   Component Value Date    TRIG 225 (H) 10/01/2020    TRIG 225 07/07/2020    TRIG 313 12/12/2019     Lab Results   Component Value Date    HDL 58 10/01/2020    HDL 59 07/07/2020    HDL 51 12/12/2019     Lab Results   Component Value Date    LDLCALC 91 10/01/2020    LDLCALC 169 (A) 07/07/2020    [employer] 156 12/12/2019     No results found for: LABVLDL      Chemistry        Component Value Date/Time     10/01/2020 1205    K 4.5 10/01/2020 1205     10/01/2020 1205    CO2 26 10/01/2020 1205    BUN 18 10/01/2020 1205    CREATININE 0.8 10/01/2020 1205        Component Value Date/Time    CALCIUM 10.3 10/01/2020 1205    ALKPHOS 85 10/01/2020 1205    AST 20 10/01/2020 1205    ALT 24 10/01/2020 1205    BILITOT 0.6 10/01/2020 1205            Lab Results   Component Value Date    TSH 1.480 10/01/2020       Lab Results   Component Value Date    WBC 8.5 10/01/2020    HGB 14.7 10/01/2020    HCT 44.9 10/01/2020    MCV 91.1 10/01/2020     10/01/2020         Health Maintenance   Topic Date Due    Hepatitis C screen  1965    HIV screen  05/03/1980    DTaP/Tdap/Td vaccine (1 - Tdap) 05/03/1984    Shingles Vaccine (1 of 2) 05/03/2015    Flu vaccine (1) 09/01/2020    A1C test (Diabetic or Prediabetic)  10/01/2021    Lipid screen  10/01/2021    Colon cancer screen colonoscopy  08/04/2027    Hepatitis A vaccine  Aged Out    Hepatitis B vaccine  Aged Out    Hib vaccine  Aged Out    Meningococcal (ACWY) vaccine  Aged Out    Pneumococcal 0-64 years Vaccine  Aged Out         There is no immunization history on file for this patient. Review of Systems   Constitutional: Negative. HENT: Negative. Respiratory: Negative. Cardiovascular: Negative. Gastrointestinal: Negative. Musculoskeletal: Negative. All other systems reviewed and are negative. Objective:   Physical Exam  Vitals signs and nursing note reviewed. Constitutional:       Appearance: He is well-developed. HENT:      Head: Normocephalic and atraumatic. Right Ear: Tympanic membrane normal.      Left Ear: Tympanic membrane normal.   Neck:      Musculoskeletal: Neck supple. Vascular: No carotid bruit. Cardiovascular:      Rate and Rhythm: Normal rate and regular rhythm.       Heart sounds: Normal heart

## 2020-10-09 NOTE — PROGRESS NOTES
Chronic Disease Visit Information    BP Readings from Last 3 Encounters:   10/09/20 124/68   07/09/20 (!) 140/78   12/17/19 120/78          Hemoglobin A1C (%)   Date Value   10/01/2020 5.7   07/07/2020 5.6   06/12/2019 5.5     LDL Calculated (mg/dL)   Date Value   10/01/2020 91     HDL   Date Value   10/01/2020 58 mg/dL   12/05/2011 48 mg/dl     BUN (mg/dL)   Date Value   10/01/2020 18     CREATININE (mg/dL)   Date Value   10/01/2020 0.8     Glucose (mg/dL)   Date Value   10/01/2020 91            Have you changed or started any medications since your last visit including any over-the-counter medicines, vitamins, or herbal medicines? no   Are you having any side effects from any of your medications? -  no  Have you stopped taking any of your medications? Is so, why? -  no    Have you seen any other physician or provider since your last visit? No  Have you had any other diagnostic tests since your last visit? Yes - Records Obtained  Have you been seen in the emergency room and/or had an admission to a hospital since we last saw you? No  Have you had your annual diabetic retinal (eye) exam? No  Have you had your routine dental cleaning in the past 6 months? yes -     Have you activated your [employer] account? If not, what are your barriers?  Yes     Patient Care Team:  Mauro Jennings DO as PCP - General (Family Medicine)  Mauro Jennings DO as PCP - [facility]         Medical History Review  Past Medical, Family, and Social History reviewed and does contribute to the patient presenting condition    Health Maintenance   Topic Date Due    Hepatitis C screen  1965    HIV screen  05/03/1980    DTaP/Tdap/Td vaccine (1 - Tdap) 05/03/1984    Shingles Vaccine (1 of 2) 05/03/2015    Flu vaccine (1) 09/01/2020    A1C test (Diabetic or Prediabetic)  10/01/2021    Lipid screen  10/01/2021    Colon cancer screen colonoscopy  08/04/2027    Hepatitis A vaccine  Aged Out    Hepatitis B vaccine Aged Out    Hib vaccine  Aged Out    Meningococcal (ACWY) vaccine  Aged Out    Pneumococcal 0-64 years Vaccine  Aged Out

## 2021-04-05 ENCOUNTER — NURSE ONLY (OUTPATIENT)
Dept: LAB | Age: 56
End: 2021-04-05

## 2021-04-05 DIAGNOSIS — R97.20 ELEVATED PSA: ICD-10-CM

## 2021-04-05 LAB — PROSTATE SPECIFIC ANTIGEN: 3.68 NG/ML (ref 0–1)

## 2021-04-09 ENCOUNTER — OFFICE VISIT (OUTPATIENT)
Dept: FAMILY MEDICINE CLINIC | Age: 56
End: 2021-04-09
Payer: COMMERCIAL

## 2021-04-09 VITALS
WEIGHT: 221.9 LBS | SYSTOLIC BLOOD PRESSURE: 128 MMHG | BODY MASS INDEX: 32.3 KG/M2 | HEART RATE: 72 BPM | RESPIRATION RATE: 16 BRPM | DIASTOLIC BLOOD PRESSURE: 76 MMHG

## 2021-04-09 DIAGNOSIS — R73.01 IFG (IMPAIRED FASTING GLUCOSE): ICD-10-CM

## 2021-04-09 DIAGNOSIS — Z12.5 SCREENING FOR PROSTATE CANCER: ICD-10-CM

## 2021-04-09 DIAGNOSIS — N52.9 ERECTILE DYSFUNCTION, UNSPECIFIED ERECTILE DYSFUNCTION TYPE: ICD-10-CM

## 2021-04-09 DIAGNOSIS — I10 ESSENTIAL HYPERTENSION: Primary | ICD-10-CM

## 2021-04-09 DIAGNOSIS — E78.00 PURE HYPERCHOLESTEROLEMIA: ICD-10-CM

## 2021-04-09 DIAGNOSIS — Z80.42 FAMILY HISTORY OF PROSTATE CANCER: ICD-10-CM

## 2021-04-09 PROCEDURE — 99214 OFFICE O/P EST MOD 30 MIN: CPT | Performed by: FAMILY MEDICINE

## 2021-04-09 RX ORDER — SILDENAFIL 50 MG/1
50 TABLET, FILM COATED ORAL DAILY PRN
Qty: 30 TABLET | Refills: 5 | Status: SHIPPED | OUTPATIENT
Start: 2021-04-09 | End: 2021-10-08 | Stop reason: SDUPTHER

## 2021-04-09 RX ORDER — AMLODIPINE BESYLATE 5 MG/1
TABLET ORAL
Qty: 90 TABLET | Refills: 3 | Status: SHIPPED | OUTPATIENT
Start: 2021-04-09 | End: 2022-10-19 | Stop reason: SDUPTHER

## 2021-04-09 RX ORDER — ATORVASTATIN CALCIUM 20 MG/1
20 TABLET, FILM COATED ORAL NIGHTLY
Qty: 90 TABLET | Refills: 3 | Status: SHIPPED | OUTPATIENT
Start: 2021-04-09 | End: 2022-10-19 | Stop reason: SDUPTHER

## 2021-04-09 ASSESSMENT — ENCOUNTER SYMPTOMS
GASTROINTESTINAL NEGATIVE: 1
RESPIRATORY NEGATIVE: 1

## 2021-04-09 ASSESSMENT — PATIENT HEALTH QUESTIONNAIRE - PHQ9
SUM OF ALL RESPONSES TO PHQ QUESTIONS 1-9: 0
SUM OF ALL RESPONSES TO PHQ QUESTIONS 1-9: 0
SUM OF ALL RESPONSES TO PHQ9 QUESTIONS 1 & 2: 0
1. LITTLE INTEREST OR PLEASURE IN DOING THINGS: 0

## 2021-04-09 NOTE — PROGRESS NOTES
Chronic Disease Visit Information    BP Readings from Last 3 Encounters:   04/09/21 128/76   10/09/20 124/68   07/09/20 (!) 140/78          Hemoglobin A1C (%)   Date Value   10/01/2020 5.7   07/07/2020 5.6   06/12/2019 5.5     LDL Calculated (mg/dL)   Date Value   10/01/2020 91     HDL   Date Value   10/01/2020 58 mg/dL   12/05/2011 48 mg/dl     BUN (mg/dL)   Date Value   10/01/2020 18     CREATININE (mg/dL)   Date Value   10/01/2020 0.8     Glucose (mg/dL)   Date Value   10/01/2020 91            Have you changed or started any medications since your last visit including any over-the-counter medicines, vitamins, or herbal medicines? no   Are you having any side effects from any of your medications? -  no  Have you stopped taking any of your medications? Is so, why? -  no    Have you seen any other physician or provider since your last visit? No  Have you had any other diagnostic tests since your last visit? Yes - Records Obtained  Have you been seen in the emergency room and/or had an admission to a hospital since we last saw you? No  Have you had your annual diabetic retinal (eye) exam? No  Have you had your routine dental cleaning in the past 6 months? yes - 1/2021    Have you activated your [employer] account? If not, what are your barriers?  Yes     Patient Care Team:  Antonio Andrews, DO as PCP - General (Family Medicine)  Antonio Andrews, DO as PCP - [facility]         Medical History Review  Past Medical, Family, and Social History reviewed and does contribute to the patient presenting condition    Health Maintenance   Topic Date Due    Hepatitis C screen  Never done    HIV screen  Never done    COVID-19 Vaccine (1) Never done    DTaP/Tdap/Td vaccine (1 - Tdap) Never done    Shingles Vaccine (1 of 2) Never done    Flu vaccine (Season Ended) 09/01/2021    A1C test (Diabetic or Prediabetic)  10/01/2021    Lipid screen  10/01/2021    Colon cancer screen colonoscopy  08/04/2027   

## 2021-04-09 NOTE — PROGRESS NOTES
2021    Minoo Blackwell (:  1965) is a 54 y.o. male, here for a preventive medicine evaluation. Chief Complaint   Patient presents with    6 Month Follow-Up    Hypertension    Results     6 month eval.  Doing well overall. BPs fine. BP Readings from Last 3 Encounters:   21 128/76   10/09/20 124/68   20 (!) 140/78     Weight up 10 lbs, has not been eating the best.  Still very active. Wt Readings from Last 3 Encounters:   21 221 lb 14.4 oz (100.7 kg)   10/09/20 211 lb (95.7 kg)   20 210 lb 1.6 oz (95.3 kg)     PSA mildly elevated from previous. 3.4 this time last year. famhx prostate cancer. Lab Results   Component Value Date    PSA 3.68 (H) 2021    PSA 2.76 (H) 10/01/2020         Patient Active Problem List   Diagnosis    Hyperlipidemia    HTN (hypertension)    Obesity    ED (erectile dysfunction)    Family history of prostate cancer       Review of Systems   Constitutional: Negative. HENT: Negative. Respiratory: Negative. Cardiovascular: Negative. Gastrointestinal: Negative. Musculoskeletal: Negative. All other systems reviewed and are negative. Prior to Visit Medications    Medication Sig Taking?  Authorizing Provider   amLODIPine (NORVASC) 5 MG tablet TAKE 1 TABLET BY MOUTH DAILY Yes Jose Schroeder, DO   atorvastatin (LIPITOR) 20 MG tablet Take 1 tablet by mouth nightly Yes Jose Schroeder, DO   sildenafil (VIAGRA) 50 MG tablet Take 1 tablet by mouth daily as needed for Erectile Dysfunction Yes Jose Schroeder, DO        No Known Allergies    Past Medical History:   Diagnosis Date    ED (erectile dysfunction) 2013    Hyperlipidemia     Hypertension     Obesity        Past Surgical History:   Procedure Laterality Date    WISDOM TOOTH EXTRACTION  2018       Social History     Socioeconomic History    Marital status: Single     Spouse name: Not on file    Number of children: 1    Years of education: 15    Highest education level: Not on file   Occupational History     Comment: [employer]    Social Needs    Financial resource strain: Not hard at all   [place] insecurity     Worry: Never true     Inability: Never true   [employer] needs     Medical: No     Non-medical: No   Tobacco Use    Smoking status: Never Smoker    Smokeless tobacco: Never Used   Substance and Sexual Activity    Alcohol use: Yes     Comment: social    Drug use: No    Sexual activity: Not on file   Lifestyle    Physical activity     Days per week: Not on file     Minutes per session: Not on file    Stress: Not on file   Relationships    Social connections     Talks on phone: Not on file     Gets together: Not on file     Attends [employer] service: Not on file     Active member of club or organization: Not on file     Attends meetings of clubs or organizations: Not on file     Relationship status: Not on file    Intimate partner violence     Fear of current or ex partner: Not on file     Emotionally abused: Not on file     Physically abused: Not on file     Forced sexual activity: Not on file   Other Topics Concern    Not on file   Social History Narrative    Not on file        Family History   Problem Relation Age of Onset    Cancer Father 76        prostate cancer    Heart Disease Father 79        heart attack    Colon Polyps Sister     Colon Cancer Neg Hx     Breast Cancer Neg Hx        ADVANCE DIRECTIVE: N, <no information>    Vitals:    04/09/21 1004   BP: 128/76   Site: Left Upper Arm   Position: Sitting   Cuff Size: Large Adult   Pulse: 72   Resp: 16   Weight: 221 lb 14.4 oz (100.7 kg)     Estimated body mass index is 32.3 kg/m² as calculated from the following:    Height as of 7/9/20: 5' 9.5\" (1.765 m). Weight as of this encounter: 221 lb 14.4 oz (100.7 kg). Physical Exam  Vitals signs and nursing note reviewed. Constitutional:       General: He is not in acute distress. Appearance: Normal appearance. He is well-developed. HENT:      Head: Normocephalic and atraumatic. Right Ear: Tympanic membrane normal.      Left Ear: Tympanic membrane normal.   Eyes:      Conjunctiva/sclera: Conjunctivae normal.   Neck:      Musculoskeletal: Neck supple. Cardiovascular:      Rate and Rhythm: Normal rate and regular rhythm. Heart sounds: Normal heart sounds. No murmur. Pulmonary:      Effort: Pulmonary effort is normal.      Breath sounds: Normal breath sounds. No wheezing, rhonchi or rales. Abdominal:      General: There is no distension. Skin:     General: Skin is warm and dry. Findings: No rash (on exposed surfaces). Neurological:      General: No focal deficit present. Mental Status: He is alert. Psychiatric:         Attention and Perception: Attention normal.         Mood and Affect: Mood normal.         Speech: Speech normal.         Behavior: Behavior normal. Behavior is cooperative. Thought Content: Thought content normal.         Judgment: Judgment normal.         No flowsheet data found. Lab Results   Component Value Date    CHOL 194 10/01/2020    CHOL 273 07/07/2020    CHOL 270 12/12/2019    TRIG 225 10/01/2020    TRIG 225 07/07/2020    TRIG 313 12/12/2019    HDL 58 10/01/2020    HDL 59 07/07/2020    HDL 51 12/12/2019    HDL 48 12/05/2011    LDLCALC 91 10/01/2020    LDLCALC 169 07/07/2020    LDLCALC 156 12/12/2019    GLUCOSE 91 10/01/2020    LABA1C 5.7 10/01/2020    LABA1C 5.6 07/07/2020    LABA1C 5.5 06/12/2019       The 10-year ASCVD risk score (Alice Tapia et al., 2013) is: 5.5%    Values used to calculate the score:      Age: 54 years      Sex: Male      Is Non- : No      Diabetic: No      Tobacco smoker: No      Systolic Blood Pressure: 313 mmHg      Is BP treated: Yes      HDL Cholesterol: 58 mg/dL      Total Cholesterol: 194 mg/dL      There is no immunization history on file for this patient.     Health Maintenance   Topic Date Due    Hepatitis C screen  Never done    HIV screen  Never done    COVID-19 Vaccine (1) Never done    DTaP/Tdap/Td vaccine (1 - Tdap) Never done    Shingles Vaccine (1 of 2) Never done    Flu vaccine (Season Ended) 09/01/2021    A1C test (Diabetic or Prediabetic)  10/01/2021    Lipid screen  10/01/2021    Colon cancer screen colonoscopy  08/04/2027    Hepatitis A vaccine  Aged Out    Hepatitis B vaccine  Aged Out    Hib vaccine  Aged Out    Meningococcal (ACWY) vaccine  Aged Out    Pneumococcal 0-64 years Vaccine  Aged Out       ASSESSMENT/PLAN:  1. Essential hypertension  -     amLODIPine (NORVASC) 5 MG tablet; TAKE 1 TABLET BY MOUTH DAILY, Disp-90 tablet, R-3Normal  -     Comprehensive Metabolic Panel; Future  -     CBC Auto Differential; Future  2. Pure hypercholesterolemia  -     atorvastatin (LIPITOR) 20 MG tablet; Take 1 tablet by mouth nightly, Disp-90 tablet, R-3Normal  -     Lipid Panel w/ Reflex Direct LDL; Future  -     TSH with Reflex; Future  3. Family history of prostate cancer  -     PSA screening; Future  4. Erectile dysfunction, unspecified erectile dysfunction type  5. IFG (impaired fasting glucose)  -     Comprehensive Metabolic Panel; Future  -     Hemoglobin A1C; Future  6. Screening for prostate cancer  -     PSA, free; Future  -     PSA screening; Future    -  Chronic medical problems stable  -  Continue current medications  -  Check labs again 6 mos    Return in about 6 months (around 10/9/2021) for HTN. An electronic signature was used to authenticate this note.     --Eder Alegria, DO on 4/9/2021 at 10:28 AM

## 2021-04-09 NOTE — PATIENT INSTRUCTIONS
You may receive a survey about your visit with us today. The feedback from our patients helps us identify what is working well and where the service to all patients can be enhanced. Thank you! Patient Education        Learning About High Blood Pressure  What is high blood pressure? Blood pressure is a measure of how hard the blood pushes against the walls of your arteries. It's normal for blood pressure to go up and down throughout the day. But if it stays up, you have high blood pressure. Another name for high blood pressure is hypertension. Two numbers tell you your blood pressure. The first number is the systolic pressure (top number). It shows how hard the blood pushes when your heart is pumping. The second number is the diastolic pressure (bottom number). It shows how hard the blood pushes between heartbeats, when your heart is relaxed and filling with blood. Your doctor will give you a goal for your blood pressure based on your health and your age. High blood pressure (hypertension) means that the top number stays high, or the bottom number stays high, or both. High blood pressure increases the risk of stroke, heart attack, and other problems. What happens when you have high blood pressure? · Blood flows through your arteries with too much force. Over time, this can damage the heart and the walls of your arteries. But you can't feel it. High blood pressure usually doesn't cause symptoms. · High blood pressure makes your heart work harder. And that can lead to heart failure, which means your heart doesn't pump as much blood as your body needs. · Fat and calcium start to build up in your arteries. This buildup is called hardening of the arteries. It can cause many problems including a heart attack and stroke. · Arteries also carry blood and oxygen to organs like your eyes, kidneys, and brain.  If high blood pressure damages those arteries, it can lead to vision loss, kidney disease, stroke, and \"Learning About High Blood Pressure. \"     If you do not have an account, please click on the \"Sign Up Now\" link. Current as of: August 31, 2020               Content Version: 12.8  © 3450-4952 [employer]. Care instructions adapted under license by [facility] ([facility]). If you have questions about a medical condition or this instruction, always ask your healthcare professional. [address] any warranty or liability for your use of this information.

## 2021-10-04 ENCOUNTER — NURSE ONLY (OUTPATIENT)
Dept: LAB | Age: 56
End: 2021-10-04

## 2021-10-04 DIAGNOSIS — Z80.42 FAMILY HISTORY OF PROSTATE CANCER: ICD-10-CM

## 2021-10-04 DIAGNOSIS — I10 ESSENTIAL HYPERTENSION: ICD-10-CM

## 2021-10-04 DIAGNOSIS — R73.01 IFG (IMPAIRED FASTING GLUCOSE): ICD-10-CM

## 2021-10-04 DIAGNOSIS — E78.00 PURE HYPERCHOLESTEROLEMIA: ICD-10-CM

## 2021-10-04 DIAGNOSIS — Z12.5 SCREENING FOR PROSTATE CANCER: ICD-10-CM

## 2021-10-04 LAB
ALBUMIN SERPL-MCNC: 4.7 G/DL (ref 3.5–5.1)
ALP BLD-CCNC: 94 U/L (ref 38–126)
ALT SERPL-CCNC: 23 U/L (ref 11–66)
ANION GAP SERPL CALCULATED.3IONS-SCNC: 12 MEQ/L (ref 8–16)
AST SERPL-CCNC: 19 U/L (ref 5–40)
AVERAGE GLUCOSE: 108 MG/DL (ref 70–126)
BASOPHILS # BLD: 0.2 %
BASOPHILS ABSOLUTE: 0 THOU/MM3 (ref 0–0.1)
BILIRUB SERPL-MCNC: 0.5 MG/DL (ref 0.3–1.2)
BUN BLDV-MCNC: 16 MG/DL (ref 7–22)
CALCIUM SERPL-MCNC: 9.9 MG/DL (ref 8.5–10.5)
CHLORIDE BLD-SCNC: 101 MEQ/L (ref 98–111)
CHOLESTEROL, TOTAL: 197 MG/DL (ref 100–199)
CO2: 26 MEQ/L (ref 23–33)
CREAT SERPL-MCNC: 0.9 MG/DL (ref 0.4–1.2)
EOSINOPHIL # BLD: 0.4 %
EOSINOPHILS ABSOLUTE: 0 THOU/MM3 (ref 0–0.4)
ERYTHROCYTE [DISTWIDTH] IN BLOOD BY AUTOMATED COUNT: 12.6 % (ref 11.5–14.5)
ERYTHROCYTE [DISTWIDTH] IN BLOOD BY AUTOMATED COUNT: 41.9 FL (ref 35–45)
GFR SERPL CREATININE-BSD FRML MDRD: 87 ML/MIN/1.73M2
GLUCOSE BLD-MCNC: 103 MG/DL (ref 70–108)
HBA1C MFR BLD: 5.6 % (ref 4.4–6.4)
HCT VFR BLD CALC: 44.5 % (ref 42–52)
HDLC SERPL-MCNC: 56 MG/DL
HEMOGLOBIN: 14.4 GM/DL (ref 14–18)
IMMATURE GRANS (ABS): 0.02 THOU/MM3 (ref 0–0.07)
IMMATURE GRANULOCYTES: 0.2 %
LDL CHOLESTEROL CALCULATED: 97 MG/DL
LYMPHOCYTES # BLD: 22.3 %
LYMPHOCYTES ABSOLUTE: 1.9 THOU/MM3 (ref 1–4.8)
MCH RBC QN AUTO: 29.3 PG (ref 26–33)
MCHC RBC AUTO-ENTMCNC: 32.4 GM/DL (ref 32.2–35.5)
MCV RBC AUTO: 90.6 FL (ref 80–94)
MONOCYTES # BLD: 8 %
MONOCYTES ABSOLUTE: 0.7 THOU/MM3 (ref 0.4–1.3)
NUCLEATED RED BLOOD CELLS: 0 /100 WBC
PLATELET # BLD: 271 THOU/MM3 (ref 130–400)
PMV BLD AUTO: 10.9 FL (ref 9.4–12.4)
POTASSIUM SERPL-SCNC: 4.6 MEQ/L (ref 3.5–5.2)
PROSTATE SPECIFIC ANTIGEN: 2.81 NG/ML (ref 0–1)
RBC # BLD: 4.91 MILL/MM3 (ref 4.7–6.1)
SEG NEUTROPHILS: 68.9 %
SEGMENTED NEUTROPHILS ABSOLUTE COUNT: 5.7 THOU/MM3 (ref 1.8–7.7)
SODIUM BLD-SCNC: 139 MEQ/L (ref 135–145)
TOTAL PROTEIN: 7.4 G/DL (ref 6.1–8)
TRIGL SERPL-MCNC: 222 MG/DL (ref 0–199)
TSH SERPL DL<=0.05 MIU/L-ACNC: 1.84 UIU/ML (ref 0.4–4.2)
WBC # BLD: 8.3 THOU/MM3 (ref 4.8–10.8)

## 2021-10-07 LAB — PROSTATE SPECIFIC ANTIGEN FREE: NORMAL

## 2021-10-08 ENCOUNTER — OFFICE VISIT (OUTPATIENT)
Dept: FAMILY MEDICINE CLINIC | Age: 56
End: 2021-10-08
Payer: COMMERCIAL

## 2021-10-08 VITALS
WEIGHT: 224.1 LBS | HEIGHT: 69 IN | SYSTOLIC BLOOD PRESSURE: 124 MMHG | RESPIRATION RATE: 16 BRPM | BODY MASS INDEX: 33.19 KG/M2 | DIASTOLIC BLOOD PRESSURE: 82 MMHG | HEART RATE: 72 BPM

## 2021-10-08 DIAGNOSIS — I10 ESSENTIAL HYPERTENSION: Primary | ICD-10-CM

## 2021-10-08 DIAGNOSIS — E78.00 PURE HYPERCHOLESTEROLEMIA: ICD-10-CM

## 2021-10-08 DIAGNOSIS — E66.9 OBESITY (BMI 30-39.9): ICD-10-CM

## 2021-10-08 DIAGNOSIS — N52.9 ERECTILE DYSFUNCTION, UNSPECIFIED ERECTILE DYSFUNCTION TYPE: ICD-10-CM

## 2021-10-08 DIAGNOSIS — Z80.42 FAMILY HISTORY OF PROSTATE CANCER: ICD-10-CM

## 2021-10-08 DIAGNOSIS — R73.01 IFG (IMPAIRED FASTING GLUCOSE): ICD-10-CM

## 2021-10-08 PROCEDURE — 99214 OFFICE O/P EST MOD 30 MIN: CPT | Performed by: FAMILY MEDICINE

## 2021-10-08 RX ORDER — SILDENAFIL 50 MG/1
50 TABLET, FILM COATED ORAL DAILY PRN
Qty: 30 TABLET | Refills: 5 | Status: SHIPPED | OUTPATIENT
Start: 2021-10-08 | End: 2021-12-08

## 2021-10-08 SDOH — ECONOMIC STABILITY: FOOD INSECURITY: WITHIN THE PAST 12 MONTHS, YOU WORRIED THAT YOUR FOOD WOULD RUN OUT BEFORE YOU GOT MONEY TO BUY MORE.: NEVER TRUE

## 2021-10-08 SDOH — ECONOMIC STABILITY: FOOD INSECURITY: WITHIN THE PAST 12 MONTHS, THE FOOD YOU BOUGHT JUST DIDN'T LAST AND YOU DIDN'T HAVE MONEY TO GET MORE.: NEVER TRUE

## 2021-10-08 ASSESSMENT — ENCOUNTER SYMPTOMS
GASTROINTESTINAL NEGATIVE: 1
RESPIRATORY NEGATIVE: 1

## 2021-10-08 ASSESSMENT — SOCIAL DETERMINANTS OF HEALTH (SDOH): HOW HARD IS IT FOR YOU TO PAY FOR THE VERY BASICS LIKE FOOD, HOUSING, MEDICAL CARE, AND HEATING?: NOT HARD AT ALL

## 2021-10-08 NOTE — PROGRESS NOTES
Lis Lazaro (:  1965) is a 64 y.o. male,Established patient, here for evaluation of the following chief complaint(s):  6 Month Follow-Up and Results        Subjective   SUBJECTIVE/OBJECTIVE:  HPI:    Chief Complaint   Patient presents with    6 Month Follow-Up    Results     6 month eval.  Doing well. BPs and weight stable. BP Readings from Last 3 Encounters:   10/08/21 124/82   21 128/76   10/09/20 124/68     Wt Readings from Last 3 Encounters:   10/08/21 224 lb 1.6 oz (101.7 kg)   21 221 lb 14.4 oz (100.7 kg)   10/09/20 211 lb (95.7 kg)     PSA down. Lab Results   Component Value Date    PSA 2.81 (H) 10/04/2021    PSA 3.68 (H) 2021    PSA 2.76 (H) 10/01/2020       Patient Active Problem List   Diagnosis    Hyperlipidemia    HTN (hypertension)    Obesity    ED (erectile dysfunction)    Family history of prostate cancer     Past Surgical History:   Procedure Laterality Date    WISDOM TOOTH EXTRACTION  2018     Social History     Tobacco Use    Smoking status: Never Smoker    Smokeless tobacco: Never Used   Vaping Use    Vaping Use: Never used   Substance Use Topics    Alcohol use: Yes     Comment: social    Drug use: No         Review of Systems   Constitutional: Negative. HENT: Negative. Respiratory: Negative. Cardiovascular: Negative. Gastrointestinal: Negative. Musculoskeletal: Negative. All other systems reviewed and are negative. Objective   Physical Exam  Vitals and nursing note reviewed. Constitutional:       General: He is not in acute distress. Appearance: Normal appearance. He is well-developed. HENT:      Head: Normocephalic and atraumatic. Right Ear: Tympanic membrane normal.      Left Ear: Tympanic membrane normal.   Eyes:      Conjunctiva/sclera: Conjunctivae normal.   Cardiovascular:      Rate and Rhythm: Normal rate and regular rhythm. Heart sounds: Normal heart sounds. No murmur heard.      Pulmonary:

## 2021-12-08 RX ORDER — SILDENAFIL 50 MG/1
TABLET, FILM COATED ORAL
Qty: 30 TABLET | Refills: 0 | Status: SHIPPED | OUTPATIENT
Start: 2021-12-08 | End: 2022-02-02

## 2022-02-02 RX ORDER — SILDENAFIL 50 MG/1
TABLET, FILM COATED ORAL
Qty: 30 TABLET | Refills: 0 | Status: SHIPPED | OUTPATIENT
Start: 2022-02-02 | End: 2022-04-19 | Stop reason: SDUPTHER

## 2022-04-04 ENCOUNTER — TELEPHONE (OUTPATIENT)
Dept: FAMILY MEDICINE CLINIC | Age: 57
End: 2022-04-04

## 2022-04-04 NOTE — TELEPHONE ENCOUNTER
Pt is requesting lab orders for his upcoming appt April 19th. Madhu Cadena he usually gets every 6 months. Especially wanting PSA.

## 2022-04-19 ENCOUNTER — OFFICE VISIT (OUTPATIENT)
Dept: FAMILY MEDICINE CLINIC | Age: 57
End: 2022-04-19
Payer: COMMERCIAL

## 2022-04-19 VITALS
RESPIRATION RATE: 16 BRPM | HEART RATE: 72 BPM | SYSTOLIC BLOOD PRESSURE: 132 MMHG | BODY MASS INDEX: 32.92 KG/M2 | DIASTOLIC BLOOD PRESSURE: 80 MMHG | WEIGHT: 222.9 LBS

## 2022-04-19 DIAGNOSIS — N52.9 ERECTILE DYSFUNCTION, UNSPECIFIED ERECTILE DYSFUNCTION TYPE: ICD-10-CM

## 2022-04-19 DIAGNOSIS — R73.01 IFG (IMPAIRED FASTING GLUCOSE): ICD-10-CM

## 2022-04-19 DIAGNOSIS — Z12.5 SCREENING FOR PROSTATE CANCER: ICD-10-CM

## 2022-04-19 DIAGNOSIS — E66.9 OBESITY (BMI 30-39.9): ICD-10-CM

## 2022-04-19 DIAGNOSIS — I10 ESSENTIAL HYPERTENSION: Primary | ICD-10-CM

## 2022-04-19 DIAGNOSIS — E78.00 PURE HYPERCHOLESTEROLEMIA: ICD-10-CM

## 2022-04-19 DIAGNOSIS — Z80.42 FAMILY HISTORY OF PROSTATE CANCER: ICD-10-CM

## 2022-04-19 PROCEDURE — 99214 OFFICE O/P EST MOD 30 MIN: CPT | Performed by: FAMILY MEDICINE

## 2022-04-19 RX ORDER — SILDENAFIL 50 MG/1
TABLET, FILM COATED ORAL
Qty: 30 TABLET | Refills: 0 | Status: SHIPPED | OUTPATIENT
Start: 2022-04-19 | End: 2022-06-01

## 2022-04-19 ASSESSMENT — PATIENT HEALTH QUESTIONNAIRE - PHQ9
SUM OF ALL RESPONSES TO PHQ QUESTIONS 1-9: 0
SUM OF ALL RESPONSES TO PHQ9 QUESTIONS 1 & 2: 0
SUM OF ALL RESPONSES TO PHQ QUESTIONS 1-9: 0
1. LITTLE INTEREST OR PLEASURE IN DOING THINGS: 0
SUM OF ALL RESPONSES TO PHQ QUESTIONS 1-9: 0
SUM OF ALL RESPONSES TO PHQ QUESTIONS 1-9: 0
2. FEELING DOWN, DEPRESSED OR HOPELESS: 0

## 2022-04-19 ASSESSMENT — ENCOUNTER SYMPTOMS
GASTROINTESTINAL NEGATIVE: 1
RESPIRATORY NEGATIVE: 1

## 2022-04-19 NOTE — PROGRESS NOTES
Starr Oconnor (:  1965) is a 64 y.o. male,Established patient, here for evaluation of the following chief complaint(s):  6 Month Follow-Up and Medication Refill        Subjective   SUBJECTIVE/OBJECTIVE:  HPI:    Chief Complaint   Patient presents with    6 Month Follow-Up    Medication Refill     6 month eval.  Doing well overall. BPs and weight stable. BP Readings from Last 3 Encounters:   22 132/80   10/08/21 124/82   21 128/76     Wt Readings from Last 3 Encounters:   22 222 lb 14.4 oz (101.1 kg)   10/08/21 224 lb 1.6 oz (101.7 kg)   21 221 lb 14.4 oz (100.7 kg)     No concerns. Tolerating medications, compliant. Patient Active Problem List   Diagnosis    Hyperlipidemia    HTN (hypertension)    Obesity    ED (erectile dysfunction)    Family history of prostate cancer     Past Surgical History:   Procedure Laterality Date    WISDOM TOOTH EXTRACTION  2018     Social History     Tobacco Use    Smoking status: Never Smoker    Smokeless tobacco: Never Used   Vaping Use    Vaping Use: Never used   Substance Use Topics    Alcohol use: Yes     Comment: social    Drug use: No     Prior to Admission medications    Medication Sig Start Date End Date Taking? Authorizing Provider   sildenafil (VIAGRA) 50 MG tablet TAKE 1 TABLET BY MOUTH ONCE DAILY AS NEEDED FOR ERECTILE DYSFUNCTION 22  Yes Ar Greenwood DO   amLODIPine (NORVASC) 5 MG tablet TAKE 1 TABLET BY MOUTH DAILY 21  Yes Ar Greenwood DO   atorvastatin (LIPITOR) 20 MG tablet Take 1 tablet by mouth nightly 21  Yes Ar Greenwood DO         Review of Systems   Constitutional: Negative. HENT: Negative. Respiratory: Negative. Cardiovascular: Negative. Gastrointestinal: Negative. Musculoskeletal: Negative. All other systems reviewed and are negative. Objective   Physical Exam  Vitals and nursing note reviewed.    Constitutional:       General: He is not in acute distress. Appearance: Normal appearance. He is well-developed. HENT:      Head: Normocephalic and atraumatic. Right Ear: Tympanic membrane normal.      Left Ear: Tympanic membrane normal.   Eyes:      Conjunctiva/sclera: Conjunctivae normal.   Cardiovascular:      Rate and Rhythm: Normal rate and regular rhythm. Heart sounds: Normal heart sounds. No murmur heard. Pulmonary:      Effort: Pulmonary effort is normal.      Breath sounds: Normal breath sounds. No wheezing, rhonchi or rales. Abdominal:      General: There is no distension. Musculoskeletal:      Cervical back: Neck supple. Skin:     General: Skin is warm and dry. Findings: No rash (on exposed surfaces). Neurological:      General: No focal deficit present. Mental Status: He is alert. Psychiatric:         Attention and Perception: Attention normal.         Mood and Affect: Mood normal.         Speech: Speech normal.         Behavior: Behavior normal. Behavior is cooperative. Thought Content: Thought content normal.         Judgment: Judgment normal.               ASSESSMENT/PLAN:  1. Essential hypertension  -     CBC with Auto Differential; Future  2. Pure hypercholesterolemia  -     Lipid Panel w/ Reflex Direct LDL; Future  -     Comprehensive Metabolic Panel; Future  -     TSH with Reflex; Future  3. IFG (impaired fasting glucose)  -     Comprehensive Metabolic Panel; Future  -     Hemoglobin A1C; Future  4. Obesity (BMI 30-39.9)  5. Family history of prostate cancer  6. Erectile dysfunction, unspecified erectile dysfunction type  7. Screening for prostate cancer  -     PSA Screening; Future    -  Chronic medical problems stable  -  Continue current medications  -  Check labs 6 mos    Return in about 6 months (around 10/19/2022) for HTN. An electronic signature was used to authenticate this note.     --Ar Greenwood DO

## 2022-06-01 RX ORDER — SILDENAFIL 50 MG/1
TABLET, FILM COATED ORAL
Qty: 30 TABLET | Refills: 0 | Status: SHIPPED | OUTPATIENT
Start: 2022-06-01 | End: 2022-07-13

## 2022-07-13 RX ORDER — SILDENAFIL 50 MG/1
TABLET, FILM COATED ORAL
Qty: 30 TABLET | Refills: 0 | Status: SHIPPED | OUTPATIENT
Start: 2022-07-13 | End: 2022-08-08

## 2022-08-08 RX ORDER — SILDENAFIL 50 MG/1
TABLET, FILM COATED ORAL
Qty: 30 TABLET | Refills: 0 | Status: SHIPPED | OUTPATIENT
Start: 2022-08-08 | End: 2022-09-08

## 2022-09-08 RX ORDER — SILDENAFIL 50 MG/1
TABLET, FILM COATED ORAL
Qty: 30 TABLET | Refills: 2 | Status: SHIPPED | OUTPATIENT
Start: 2022-09-08 | End: 2022-10-19

## 2022-10-12 ENCOUNTER — NURSE ONLY (OUTPATIENT)
Dept: LAB | Age: 57
End: 2022-10-12

## 2022-10-12 DIAGNOSIS — Z12.5 SCREENING FOR PROSTATE CANCER: ICD-10-CM

## 2022-10-12 DIAGNOSIS — R73.01 IFG (IMPAIRED FASTING GLUCOSE): ICD-10-CM

## 2022-10-12 DIAGNOSIS — I10 ESSENTIAL HYPERTENSION: ICD-10-CM

## 2022-10-12 DIAGNOSIS — E78.00 PURE HYPERCHOLESTEROLEMIA: ICD-10-CM

## 2022-10-12 LAB
ALBUMIN SERPL-MCNC: 4.2 G/DL (ref 3.5–5.1)
ALP BLD-CCNC: 85 U/L (ref 38–126)
ALT SERPL-CCNC: 21 U/L (ref 11–66)
ANION GAP SERPL CALCULATED.3IONS-SCNC: 11 MEQ/L (ref 8–16)
AST SERPL-CCNC: 19 U/L (ref 5–40)
AVERAGE GLUCOSE: 105 MG/DL (ref 70–126)
BASOPHILS # BLD: 0.4 %
BASOPHILS ABSOLUTE: 0 THOU/MM3 (ref 0–0.1)
BILIRUB SERPL-MCNC: 0.6 MG/DL (ref 0.3–1.2)
BUN BLDV-MCNC: 15 MG/DL (ref 7–22)
CALCIUM SERPL-MCNC: 9.6 MG/DL (ref 8.5–10.5)
CHLORIDE BLD-SCNC: 101 MEQ/L (ref 98–111)
CHOLESTEROL, TOTAL: 245 MG/DL (ref 100–199)
CO2: 28 MEQ/L (ref 23–33)
CREAT SERPL-MCNC: 0.9 MG/DL (ref 0.4–1.2)
EOSINOPHIL # BLD: 0.6 %
EOSINOPHILS ABSOLUTE: 0.1 THOU/MM3 (ref 0–0.4)
ERYTHROCYTE [DISTWIDTH] IN BLOOD BY AUTOMATED COUNT: 12.6 % (ref 11.5–14.5)
ERYTHROCYTE [DISTWIDTH] IN BLOOD BY AUTOMATED COUNT: 40.1 FL (ref 35–45)
GFR SERPL CREATININE-BSD FRML MDRD: 87 ML/MIN/1.73M2
GLUCOSE BLD-MCNC: 91 MG/DL (ref 70–108)
HBA1C MFR BLD: 5.5 % (ref 4.4–6.4)
HCT VFR BLD CALC: 44.9 % (ref 42–52)
HDLC SERPL-MCNC: 48 MG/DL
HEMOGLOBIN: 15.4 GM/DL (ref 14–18)
IMMATURE GRANS (ABS): 0.03 THOU/MM3 (ref 0–0.07)
IMMATURE GRANULOCYTES: 0.4 %
LDL CHOLESTEROL CALCULATED: 125 MG/DL
LYMPHOCYTES # BLD: 23.4 %
LYMPHOCYTES ABSOLUTE: 2 THOU/MM3 (ref 1–4.8)
MCH RBC QN AUTO: 29.8 PG (ref 26–33)
MCHC RBC AUTO-ENTMCNC: 34.3 GM/DL (ref 32.2–35.5)
MCV RBC AUTO: 87 FL (ref 80–94)
MONOCYTES # BLD: 8.6 %
MONOCYTES ABSOLUTE: 0.7 THOU/MM3 (ref 0.4–1.3)
NUCLEATED RED BLOOD CELLS: 0 /100 WBC
PLATELET # BLD: 243 THOU/MM3 (ref 130–400)
PMV BLD AUTO: 11 FL (ref 9.4–12.4)
POTASSIUM SERPL-SCNC: 4.7 MEQ/L (ref 3.5–5.2)
PROSTATE SPECIFIC ANTIGEN: 4.13 NG/ML (ref 0–1)
RBC # BLD: 5.16 MILL/MM3 (ref 4.7–6.1)
SEG NEUTROPHILS: 66.6 %
SEGMENTED NEUTROPHILS ABSOLUTE COUNT: 5.6 THOU/MM3 (ref 1.8–7.7)
SODIUM BLD-SCNC: 140 MEQ/L (ref 135–145)
TOTAL PROTEIN: 7.4 G/DL (ref 6.1–8)
TRIGL SERPL-MCNC: 359 MG/DL (ref 0–199)
TSH SERPL DL<=0.05 MIU/L-ACNC: 1.72 UIU/ML (ref 0.4–4.2)
WBC # BLD: 8.4 THOU/MM3 (ref 4.8–10.8)

## 2022-10-19 ENCOUNTER — OFFICE VISIT (OUTPATIENT)
Dept: FAMILY MEDICINE CLINIC | Age: 57
End: 2022-10-19
Payer: COMMERCIAL

## 2022-10-19 VITALS
DIASTOLIC BLOOD PRESSURE: 80 MMHG | WEIGHT: 220.2 LBS | HEART RATE: 76 BPM | BODY MASS INDEX: 32.61 KG/M2 | SYSTOLIC BLOOD PRESSURE: 124 MMHG | RESPIRATION RATE: 16 BRPM | HEIGHT: 69 IN

## 2022-10-19 DIAGNOSIS — R97.20 ELEVATED PSA: ICD-10-CM

## 2022-10-19 DIAGNOSIS — R73.01 IFG (IMPAIRED FASTING GLUCOSE): Primary | ICD-10-CM

## 2022-10-19 DIAGNOSIS — N52.9 ERECTILE DYSFUNCTION, UNSPECIFIED ERECTILE DYSFUNCTION TYPE: ICD-10-CM

## 2022-10-19 DIAGNOSIS — Z80.42 FAMILY HISTORY OF PROSTATE CANCER: ICD-10-CM

## 2022-10-19 DIAGNOSIS — I10 ESSENTIAL HYPERTENSION: ICD-10-CM

## 2022-10-19 DIAGNOSIS — E66.9 OBESITY (BMI 30-39.9): ICD-10-CM

## 2022-10-19 DIAGNOSIS — E78.00 PURE HYPERCHOLESTEROLEMIA: ICD-10-CM

## 2022-10-19 PROCEDURE — 99214 OFFICE O/P EST MOD 30 MIN: CPT | Performed by: FAMILY MEDICINE

## 2022-10-19 RX ORDER — AMLODIPINE BESYLATE 5 MG/1
TABLET ORAL
Qty: 90 TABLET | Refills: 3 | Status: SHIPPED | OUTPATIENT
Start: 2022-10-19

## 2022-10-19 RX ORDER — ATORVASTATIN CALCIUM 20 MG/1
20 TABLET, FILM COATED ORAL NIGHTLY
Qty: 90 TABLET | Refills: 3 | Status: SHIPPED | OUTPATIENT
Start: 2022-10-19

## 2022-10-19 RX ORDER — SILDENAFIL 50 MG/1
TABLET, FILM COATED ORAL
Qty: 30 TABLET | Refills: 2 | Status: CANCELLED | OUTPATIENT
Start: 2022-10-19

## 2022-10-19 RX ORDER — SILDENAFIL 100 MG/1
100 TABLET, FILM COATED ORAL DAILY PRN
Qty: 30 TABLET | Refills: 2 | Status: SHIPPED | OUTPATIENT
Start: 2022-10-19

## 2022-10-19 SDOH — ECONOMIC STABILITY: FOOD INSECURITY: WITHIN THE PAST 12 MONTHS, YOU WORRIED THAT YOUR FOOD WOULD RUN OUT BEFORE YOU GOT MONEY TO BUY MORE.: NEVER TRUE

## 2022-10-19 SDOH — ECONOMIC STABILITY: FOOD INSECURITY: WITHIN THE PAST 12 MONTHS, THE FOOD YOU BOUGHT JUST DIDN'T LAST AND YOU DIDN'T HAVE MONEY TO GET MORE.: NEVER TRUE

## 2022-10-19 ASSESSMENT — ENCOUNTER SYMPTOMS
RESPIRATORY NEGATIVE: 1
GASTROINTESTINAL NEGATIVE: 1

## 2022-10-19 ASSESSMENT — SOCIAL DETERMINANTS OF HEALTH (SDOH): HOW HARD IS IT FOR YOU TO PAY FOR THE VERY BASICS LIKE FOOD, HOUSING, MEDICAL CARE, AND HEATING?: NOT HARD AT ALL

## 2022-10-19 NOTE — PROGRESS NOTES
Bakari Grissom (:  1965) is a 62 y.o. male,Established patient, here for evaluation of the following chief complaint(s):  6 Month Follow-Up, Hypertension, and Results        Subjective   SUBJECTIVE/OBJECTIVE:  HPI:    Chief Complaint   Patient presents with    6 Month Follow-Up    Hypertension    Results     6 month eval.    BP Readings from Last 3 Encounters:   10/19/22 124/80   22 132/80   10/08/21 124/82     Wt Readings from Last 3 Encounters:   10/19/22 220 lb 3.2 oz (99.9 kg)   22 222 lb 14.4 oz (101.1 kg)   10/08/21 224 lb 1.6 oz (101.7 kg)     PSA mildly elevated. No symptoms. Famhx of prostate cancer. Lab Results   Component Value Date    PSA 4.13 (H) 10/12/2022    PSA 2.81 (H) 10/04/2021    PSA 3.68 (H) 2021         Patient Active Problem List   Diagnosis    Hyperlipidemia    HTN (hypertension)    Obesity    ED (erectile dysfunction)    Family history of prostate cancer     Past Surgical History:   Procedure Laterality Date    WISDOM TOOTH EXTRACTION  2018     Social History     Tobacco Use    Smoking status: Never    Smokeless tobacco: Never   Vaping Use    Vaping Use: Never used   Substance Use Topics    Alcohol use: Yes     Comment: social    Drug use: No         Review of Systems   Constitutional: Negative. HENT: Negative. Respiratory: Negative. Cardiovascular: Negative. Gastrointestinal: Negative. Musculoskeletal: Negative. All other systems reviewed and are negative. Objective   Physical Exam  Vitals and nursing note reviewed. Constitutional:       General: He is not in acute distress. Appearance: Normal appearance. He is well-developed. HENT:      Head: Normocephalic and atraumatic. Right Ear: Tympanic membrane normal.      Left Ear: Tympanic membrane normal.   Eyes:      Conjunctiva/sclera: Conjunctivae normal.   Cardiovascular:      Rate and Rhythm: Normal rate and regular rhythm. Heart sounds: Normal heart sounds.  No murmur heard. Pulmonary:      Effort: Pulmonary effort is normal.      Breath sounds: Normal breath sounds. No wheezing, rhonchi or rales. Abdominal:      General: There is no distension. Musculoskeletal:      Cervical back: Neck supple. Skin:     General: Skin is warm and dry. Findings: No rash (on exposed surfaces). Neurological:      General: No focal deficit present. Mental Status: He is alert. Psychiatric:         Attention and Perception: Attention normal.         Mood and Affect: Mood normal.         Speech: Speech normal.         Behavior: Behavior normal. Behavior is cooperative. Thought Content: Thought content normal.         Judgment: Judgment normal.             ASSESSMENT/PLAN:  1. IFG (impaired fasting glucose)  2. Essential hypertension  -     amLODIPine (NORVASC) 5 MG tablet; TAKE 1 TABLET BY MOUTH DAILY, Disp-90 tablet, R-3Normal  3. Pure hypercholesterolemia  -     atorvastatin (LIPITOR) 20 MG tablet; Take 1 tablet by mouth nightly, Disp-90 tablet, R-3Normal  4. Obesity (BMI 30-39.9)  5. Family history of prostate cancer  6. Erectile dysfunction, unspecified erectile dysfunction type  -     sildenafil (VIAGRA) 100 MG tablet; Take 1 tablet by mouth daily as needed for Erectile Dysfunction, Disp-30 tablet, R-2Print  7. Elevated PSA  -     PSA Prostatic Specific Antigen; Future  -     PSA, free; Future    -  Chronic medical problems stable  -  Labs reviewed, ok overall  -  PSA mildly elevated, will recheck in 6 mos  -  LDL jumped, admittedly non-compliant with Lipitor  -  Continue current medications        Return in about 6 months (around 4/19/2023) for HTN. An electronic signature was used to authenticate this note.     --Marivel Bean DO

## 2022-12-07 DIAGNOSIS — N52.9 ERECTILE DYSFUNCTION, UNSPECIFIED ERECTILE DYSFUNCTION TYPE: ICD-10-CM

## 2022-12-07 RX ORDER — SILDENAFIL 50 MG/1
TABLET, FILM COATED ORAL
Qty: 30 TABLET | Refills: 0 | OUTPATIENT
Start: 2022-12-07

## 2022-12-07 RX ORDER — SILDENAFIL 100 MG/1
100 TABLET, FILM COATED ORAL DAILY PRN
Qty: 30 TABLET | Refills: 2 | Status: SHIPPED | OUTPATIENT
Start: 2022-12-07

## 2022-12-07 NOTE — TELEPHONE ENCOUNTER
Minh Doe called requesting a refill of the below medication which has been pended for you:     Requested Prescriptions     Pending Prescriptions Disp Refills    sildenafil (VIAGRA) 100 MG tablet 30 tablet 2     Sig: Take 1 tablet by mouth daily as needed for Erectile Dysfunction     Refused Prescriptions Disp Refills    sildenafil (VIAGRA) 50 MG tablet [Pharmacy Med Name: Sildenafil Citrate 50 MG Oral Tablet] 30 tablet 0     Sig: TAKE 1 TABLET BY MOUTH ONCE DAILY AS NEEDED FOR ERECTILE DYSFUNCTION     Refused By: Nhi Reno     Reason for Refusal: Medication dose changed       Last Appointment Date: 10/19/2022  Next Appointment Date: 4/19/2023    No Known Allergies    Per patient [facility] won't accept a printed copy. If no call back patient will check with the pharmacy at 4 pm today.

## 2023-04-19 ENCOUNTER — OFFICE VISIT (OUTPATIENT)
Dept: FAMILY MEDICINE CLINIC | Age: 58
End: 2023-04-19
Payer: COMMERCIAL

## 2023-04-19 VITALS
DIASTOLIC BLOOD PRESSURE: 70 MMHG | HEART RATE: 96 BPM | BODY MASS INDEX: 32.11 KG/M2 | WEIGHT: 216.8 LBS | RESPIRATION RATE: 16 BRPM | HEIGHT: 69 IN | SYSTOLIC BLOOD PRESSURE: 132 MMHG

## 2023-04-19 DIAGNOSIS — E66.9 OBESITY (BMI 30-39.9): ICD-10-CM

## 2023-04-19 DIAGNOSIS — E78.00 PURE HYPERCHOLESTEROLEMIA: ICD-10-CM

## 2023-04-19 DIAGNOSIS — N52.9 ERECTILE DYSFUNCTION, UNSPECIFIED ERECTILE DYSFUNCTION TYPE: ICD-10-CM

## 2023-04-19 DIAGNOSIS — Z80.42 FAMILY HISTORY OF PROSTATE CANCER: ICD-10-CM

## 2023-04-19 DIAGNOSIS — I10 ESSENTIAL HYPERTENSION: ICD-10-CM

## 2023-04-19 DIAGNOSIS — R97.20 ELEVATED PSA: Primary | ICD-10-CM

## 2023-04-19 DIAGNOSIS — R73.01 IFG (IMPAIRED FASTING GLUCOSE): ICD-10-CM

## 2023-04-19 PROBLEM — G56.01 CARPAL TUNNEL SYNDROME OF RIGHT WRIST: Status: ACTIVE | Noted: 2023-04-19

## 2023-04-19 PROBLEM — M77.11 LATERAL EPICONDYLITIS OF RIGHT ELBOW: Status: ACTIVE | Noted: 2023-04-19

## 2023-04-19 PROBLEM — R20.0 NUMBNESS: Status: ACTIVE | Noted: 2023-04-19

## 2023-04-19 PROCEDURE — 3078F DIAST BP <80 MM HG: CPT | Performed by: FAMILY MEDICINE

## 2023-04-19 PROCEDURE — 3075F SYST BP GE 130 - 139MM HG: CPT | Performed by: FAMILY MEDICINE

## 2023-04-19 PROCEDURE — 99214 OFFICE O/P EST MOD 30 MIN: CPT | Performed by: FAMILY MEDICINE

## 2023-04-19 RX ORDER — SILDENAFIL 100 MG/1
100 TABLET, FILM COATED ORAL DAILY PRN
Qty: 30 TABLET | Refills: 2 | Status: SHIPPED | OUTPATIENT
Start: 2023-04-19

## 2023-04-19 RX ORDER — ATORVASTATIN CALCIUM 20 MG/1
20 TABLET, FILM COATED ORAL NIGHTLY
Qty: 90 TABLET | Refills: 3 | Status: SHIPPED | OUTPATIENT
Start: 2023-04-19

## 2023-04-19 RX ORDER — AMLODIPINE BESYLATE 5 MG/1
TABLET ORAL
Qty: 90 TABLET | Refills: 3 | Status: SHIPPED | OUTPATIENT
Start: 2023-04-19

## 2023-04-19 SDOH — ECONOMIC STABILITY: FOOD INSECURITY: WITHIN THE PAST 12 MONTHS, THE FOOD YOU BOUGHT JUST DIDN'T LAST AND YOU DIDN'T HAVE MONEY TO GET MORE.: NEVER TRUE

## 2023-04-19 SDOH — ECONOMIC STABILITY: FOOD INSECURITY: WITHIN THE PAST 12 MONTHS, YOU WORRIED THAT YOUR FOOD WOULD RUN OUT BEFORE YOU GOT MONEY TO BUY MORE.: NEVER TRUE

## 2023-04-19 SDOH — ECONOMIC STABILITY: INCOME INSECURITY: HOW HARD IS IT FOR YOU TO PAY FOR THE VERY BASICS LIKE FOOD, HOUSING, MEDICAL CARE, AND HEATING?: NOT HARD AT ALL

## 2023-04-19 SDOH — ECONOMIC STABILITY: HOUSING INSECURITY
IN THE LAST 12 MONTHS, WAS THERE A TIME WHEN YOU DID NOT HAVE A STEADY PLACE TO SLEEP OR SLEPT IN A SHELTER (INCLUDING NOW)?: NO

## 2023-04-19 ASSESSMENT — PATIENT HEALTH QUESTIONNAIRE - PHQ9
SUM OF ALL RESPONSES TO PHQ9 QUESTIONS 1 & 2: 0
1. LITTLE INTEREST OR PLEASURE IN DOING THINGS: 0
SUM OF ALL RESPONSES TO PHQ QUESTIONS 1-9: 0
SUM OF ALL RESPONSES TO PHQ QUESTIONS 1-9: 0
2. FEELING DOWN, DEPRESSED OR HOPELESS: 0
SUM OF ALL RESPONSES TO PHQ QUESTIONS 1-9: 0
SUM OF ALL RESPONSES TO PHQ QUESTIONS 1-9: 0

## 2023-04-19 ASSESSMENT — ENCOUNTER SYMPTOMS
RESPIRATORY NEGATIVE: 1
GASTROINTESTINAL NEGATIVE: 1

## 2023-04-19 NOTE — PROGRESS NOTES
Head: Normocephalic and atraumatic. Right Ear: Tympanic membrane normal.      Left Ear: Tympanic membrane normal.   Eyes:      Conjunctiva/sclera: Conjunctivae normal.   Cardiovascular:      Rate and Rhythm: Normal rate and regular rhythm. Heart sounds: Normal heart sounds. No murmur heard. Pulmonary:      Effort: Pulmonary effort is normal.      Breath sounds: Normal breath sounds. No wheezing, rhonchi or rales. Abdominal:      General: There is no distension. Musculoskeletal:      Cervical back: Neck supple. Skin:     General: Skin is warm and dry. Findings: No rash (on exposed surfaces). Neurological:      General: No focal deficit present. Mental Status: He is alert. Psychiatric:         Attention and Perception: Attention normal.         Mood and Affect: Mood normal.         Speech: Speech normal.         Behavior: Behavior normal. Behavior is cooperative. Thought Content: Thought content normal.         Judgment: Judgment normal.             ASSESSMENT/PLAN:  1. Elevated PSA  -     Zoltan Danielle MD, Urology, [facility]  2. Family history of prostate cancer  -     Zoltan Danielle MD, Urology, [facility]  3. Essential hypertension  -     CBC with Auto Differential; Future  -     amLODIPine (NORVASC) 5 MG tablet; TAKE 1 TABLET BY MOUTH DAILY, Disp-90 tablet, R-3Normal  4. Pure hypercholesterolemia  -     Lipid Panel w/ Reflex Direct LDL; Future  -     Comprehensive Metabolic Panel; Future  -     TSH with Reflex; Future  -     atorvastatin (LIPITOR) 20 MG tablet; Take 1 tablet by mouth nightly, Disp-90 tablet, R-3Normal  5. IFG (impaired fasting glucose)  -     Comprehensive Metabolic Panel; Future  -     Hemoglobin A1C; Future  6. Obesity (BMI 30-39.9)  7. Erectile dysfunction, unspecified erectile dysfunction type  -     sildenafil (VIAGRA) 100 MG tablet;  Take 1 tablet by mouth daily as needed for Erectile Dysfunction, Disp-30 tablet, R-2Normal    -  Chronic

## 2023-05-11 ENCOUNTER — TELEPHONE (OUTPATIENT)
Dept: UROLOGY | Age: 58
End: 2023-05-11

## 2023-05-11 ENCOUNTER — OFFICE VISIT (OUTPATIENT)
Dept: UROLOGY | Age: 58
End: 2023-05-11
Payer: COMMERCIAL

## 2023-05-11 VITALS
BODY MASS INDEX: 32.79 KG/M2 | DIASTOLIC BLOOD PRESSURE: 70 MMHG | WEIGHT: 221.4 LBS | HEIGHT: 69 IN | SYSTOLIC BLOOD PRESSURE: 132 MMHG | RESPIRATION RATE: 16 BRPM

## 2023-05-11 DIAGNOSIS — N40.1 BENIGN LOCALIZED PROSTATIC HYPERPLASIA WITH LOWER URINARY TRACT SYMPTOMS (LUTS): ICD-10-CM

## 2023-05-11 DIAGNOSIS — R97.20 ELEVATED PSA: Primary | ICD-10-CM

## 2023-05-11 PROCEDURE — 3075F SYST BP GE 130 - 139MM HG: CPT | Performed by: UROLOGY

## 2023-05-11 PROCEDURE — 99204 OFFICE O/P NEW MOD 45 MIN: CPT | Performed by: UROLOGY

## 2023-05-11 PROCEDURE — 3078F DIAST BP <80 MM HG: CPT | Performed by: UROLOGY

## 2023-05-11 NOTE — TELEPHONE ENCOUNTER
Patient scheduled for MRI PROSTATE W WO  at [facility] MR on 5/26/2023 ARRIVAL OF 7AM FOR A 730AM SCAN.     Order with instructions given to the patient in the office

## 2023-05-11 NOTE — PROGRESS NOTES
Nini Yan MD.    [facility] [address] [facility] [address]  Dept: [phone]  Dept Fax: [phone]: [address] [facility] Urology Office Note -     Patient:  David Fisher  YOB: 1965    The patient is a 62 y.o. male who presents today for evaluation of the following problems: =  Chief Complaint   Patient presents with    New Patient     Elevated PSA    referred/consultation requested by Alona Vilchis DO. History of Present Illness:    Elevated PSA  Brother and father with pca  Rising- multiple checks    BPH  Auass 9 mostly satisfied  Not terribly bothered      Requested/reviewed records from Alona Vilchis DO office and/or outside physician/EMR    (Patient's old records have been requested, reviewed and pertinent findings summarized in today's note.)    Procedures Today: N/A      Last several PSA's:  Lab Results   Component Value Date    PSA 4.13 (H) 10/12/2022    PSA 2.81 (H) 10/04/2021    PSA 3.68 (H) 04/05/2021       Last total testosterone:  No results found for: TESTOSTERONE    Urinalysis today:  No results found for this visit on 05/11/23. Last BUN and creatinine:  Lab Results   Component Value Date    BUN 15 10/12/2022     Lab Results   Component Value Date    CREATININE 0.9 10/12/2022         Imaging Reviewed during this Office Visit:   Rosie Madsion MD independently reviewed the images and verified the radiology reports from:    CT Upper Extremity Left W Contrast    Result Date: 2/2/2016  PROCEDURE: CT UPPER EXTREMITY LEFT W CONTRAST CLINICAL INFORMATION: Mass of the left upper extremity. Probable abnormality of the left axillary and medial left elbow. COMPARISON: None available.  TECHNIQUE: With the patient in supine position, the left upper extremities extended above the head, axial images were obtained, extending from the fingertips inferiorly through the mid inferior thoracic

## 2023-05-26 ENCOUNTER — HOSPITAL ENCOUNTER (OUTPATIENT)
Dept: MRI IMAGING | Age: 58
Discharge: HOME | End: 2023-05-26
Payer: COMMERCIAL

## 2023-05-26 DIAGNOSIS — R97.20 ELEVATED PSA: ICD-10-CM

## 2023-05-26 LAB — POC CREATININE WHOLE BLOOD: 1.1 MG/DL (ref 0.5–1.2)

## 2023-05-26 PROCEDURE — 6360000004 HC RX CONTRAST MEDICATION: Performed by: UROLOGY

## 2023-05-26 PROCEDURE — 76377 3D RENDER W/INTRP POSTPROCES: CPT

## 2023-05-26 PROCEDURE — 82565 ASSAY OF CREATININE: CPT

## 2023-05-26 PROCEDURE — A9579 GAD-BASE MR CONTRAST NOS,1ML: HCPCS | Performed by: UROLOGY

## 2023-05-26 RX ADMIN — GADOTERIDOL 20 ML: 279.3 INJECTION, SOLUTION INTRAVENOUS at 07:57

## 2023-06-19 ENCOUNTER — OFFICE VISIT (OUTPATIENT)
Dept: UROLOGY | Age: 58
End: 2023-06-19
Payer: COMMERCIAL

## 2023-06-19 ENCOUNTER — TELEPHONE (OUTPATIENT)
Dept: UROLOGY | Age: 58
End: 2023-06-19

## 2023-06-19 VITALS — RESPIRATION RATE: 16 BRPM | BODY MASS INDEX: 32.14 KG/M2 | WEIGHT: 217 LBS | HEIGHT: 69 IN

## 2023-06-19 DIAGNOSIS — R97.20 ELEVATED PSA: ICD-10-CM

## 2023-06-19 DIAGNOSIS — N40.1 BENIGN LOCALIZED PROSTATIC HYPERPLASIA WITH LOWER URINARY TRACT SYMPTOMS (LUTS): Primary | ICD-10-CM

## 2023-06-19 PROCEDURE — 99214 OFFICE O/P EST MOD 30 MIN: CPT | Performed by: NURSE PRACTITIONER

## 2023-06-19 RX ORDER — CIPROFLOXACIN 500 MG/1
500 TABLET, FILM COATED ORAL 2 TIMES DAILY
Qty: 6 TABLET | Refills: 0 | Status: SHIPPED | OUTPATIENT
Start: 2023-06-19 | End: 2023-06-22

## 2023-06-19 NOTE — TELEPHONE ENCOUNTER
MRI FUSION GUIDED PROSTATE ULTRASOUND/BIOPSY WITH DR Andreia Person      1965    You are scheduled for the above procedure on:    7/11/23   at:    10:30 AM  Your follow up appointment for your biopsy results is on:    7/25/23     At:   3:30 PM    Please note that you will be responsible for any charges that are not paid by your insurance. The prostate biopsy specimens are sent to a Lab and their charges are billed to you separate from the office charges. DESCRIPTION OF PROSTATIC ULTRASOUND AND BIOPSY  Ultrasound uses harmless sound waves to give us pictures of the prostate, and allows us to accurately guide a biopsy needle to areas of concern. The procedure is done in the office. Initially, a complete prostate exam is done. Next the ultrasound probe, finger-like in size and shape, is placed into the rectum. With slight movement of the probe, many different views are obtained. A prostate block may or may not be given at this time. A spring loaded fine needle is place through the probe and directed into the prostate. Six or more biopsies are usually taken. These biopsies are not usually painful. The entire exam takes 20-30 minutes. POSSIBLE RISKS OF THE PROCEDURE  Blood may be noted in the stool and urine for a few days. Blood may be seen in the semen for up to a month. Infection of the prostate or in the urine can occur even with antibiotic preparation. You should call if you develop fever, chills, severe pain, or have continuous or significant bleeding. If you have known hemorrhoids, you may have more bleeding and discomfort in the rectal area following the biopsy. This may last for 3-5 days afterwards. If any concerns arise, please call the office.     PREPARATION** PLEASE EAT A REGULAR LUNCH OR BREAKFAST**    1.  DO NOT TAKE: ASPIRIN, MOTRIN,  IBUPROFEN, MOBIC/ MELOXICAM, COUMADIN( WARFARIN) FISH OIL, AND VITAMIN E FOR 5 DAYS BEFORE THE BIOPSY AND 3 DAYS AFTER

## 2023-06-19 NOTE — PROGRESS NOTES
[address] [place] [address].  [address]  [facility] [address]  Dept: [phone]  Loc: [phone]    Visit Date: 6/19/2023        HPI:     Armando Velásquez is a 62 y.o. male who presents today for:  Chief Complaint   Patient presents with    Elevated PSA    Results     Review MRI results        HPI  Patient presents to urology clinic for the following:    Elevated PSA  Brother and father with pca  Rising- multiple checks  MRI PIRADS 3/5    BPH  LUTS not bothersome    Overall, Tammy Reece is feeling well. Denies bone pain or unintentional weight loss. Denies flank pain, suprapubic pressure, gross hematuria, dysuria, fever or chills. Current Outpatient Medications   Medication Sig Dispense Refill    ciprofloxacin (CIPRO) 500 MG tablet Take 1 tablet by mouth 2 times daily for 3 days Starting on the day before biopsy. Continue day of and day after. 6 tablet 0    sildenafil (VIAGRA) 100 MG tablet Take 1 tablet by mouth daily as needed for Erectile Dysfunction 30 tablet 2    atorvastatin (LIPITOR) 20 MG tablet Take 1 tablet by mouth nightly 90 tablet 3    amLODIPine (NORVASC) 5 MG tablet TAKE 1 TABLET BY MOUTH DAILY 90 tablet 3     No current facility-administered medications for this visit. Past Medical History  Linda Paige  has a past medical history of Carpal tunnel syndrome of right wrist, ED (erectile dysfunction), Hyperlipidemia, Hypertension, and Obesity. Past Surgical History  The patient  has a past surgical history that includes [place] tooth extraction (08/2018). Family History  This patient's family history includes Cancer (age of onset: 76) in his father; Colon Polyps in his sister; Heart Disease (age of onset: 79) in his father. Social History  Linda Paige  reports that he has never smoked. He has never used smokeless tobacco. He reports current alcohol use. He reports that he does not use drugs.       Subjective:      REVIEW OF

## 2023-07-11 ENCOUNTER — PROCEDURE VISIT (OUTPATIENT)
Dept: UROLOGY | Age: 58
End: 2023-07-11

## 2023-07-11 VITALS — RESPIRATION RATE: 18 BRPM | BODY MASS INDEX: 32.14 KG/M2 | HEIGHT: 69 IN | WEIGHT: 217 LBS

## 2023-07-11 DIAGNOSIS — R97.20 ELEVATED PSA: Primary | ICD-10-CM

## 2023-07-11 RX ORDER — TOBRAMYCIN 40 MG/ML
80 INJECTION INTRAMUSCULAR; INTRAVENOUS ONCE
Status: COMPLETED | OUTPATIENT
Start: 2023-07-11 | End: 2023-07-11

## 2023-07-11 RX ADMIN — TOBRAMYCIN 80 MG: 40 INJECTION INTRAMUSCULAR; INTRAVENOUS at 10:46

## 2023-07-11 NOTE — PROGRESS NOTES
Procedure: Trus/Biopsy  Pt name and birth date verified Yes  Patient agrees Dr. Ethan Walker is taking biopsies of the prostate Yes  Patient took Enema 2 hours prior to procedure Yes  Patient took 2 pill(s) of Cipro day before procedure, day of, and will the day after Yes  Has patient eaten today? Yes  Patient stopped all blood thinners prior to surgery yes    Patient has given me verbal consent to perform Tobramycin Injection  Yes    Following Dr. Gilbert Emy of care. Tobramycin 80MG/2ML GIVEN I.M right UOQ HIP  Lot Number: [id number]  Expiration Date: 12/24  [facility] #: [id number]    Tobramycin supplied by office.

## 2023-07-11 NOTE — PROGRESS NOTES
Mr. Essie Lieberman was seen in follow up for his prostate biopsy. The biopsy is being done with MRI / Ultrasound fusion using the UroNav system. The biopsy was done without difficulty or complication. TRUS prostate biopsy note:  After obtaining informed consent, the rectal ultrasound probe was passed per rectum and the prostate visualized. A local block was performed by instilling 2% lidocaine at the base. Measurements were taken for a total volume of 43 cc. At this point, prostate biopsy was performed. Using Jordan Becerra, the ultrasound and MRI images were fused and then biopsies of the lesions identified by the radiologist were taken. Three cores were taken from each of the 1 lesions seen on MRI. The rectal probe was removed and there was minimal bleeding. Mr. Essie Lieberman tolerated the procedure well and there were no complications. Prostate size: 43 cc  Cores taken:  12 in addition to    3 cores each from 1 lesions    15 total cores  Complications: none      Assessment:      Prostate biopsy performed without difficulty. This was done with UroNav MRI fused guidance. Plan:        I will see Delano Stahl back to discuss the pathology in 1-2 weeks. Further recommendations will be based on these results.

## 2023-07-25 ENCOUNTER — OFFICE VISIT (OUTPATIENT)
Dept: UROLOGY | Age: 58
End: 2023-07-25
Payer: COMMERCIAL

## 2023-07-25 VITALS — RESPIRATION RATE: 16 BRPM | WEIGHT: 217 LBS | HEIGHT: 69 IN | BODY MASS INDEX: 32.14 KG/M2

## 2023-07-25 DIAGNOSIS — N52.9 ERECTILE DYSFUNCTION, UNSPECIFIED ERECTILE DYSFUNCTION TYPE: ICD-10-CM

## 2023-07-25 DIAGNOSIS — N40.1 BENIGN LOCALIZED PROSTATIC HYPERPLASIA WITH LOWER URINARY TRACT SYMPTOMS (LUTS): ICD-10-CM

## 2023-07-25 DIAGNOSIS — C61 PROSTATE CANCER (HCC): Primary | ICD-10-CM

## 2023-07-25 PROCEDURE — 99214 OFFICE O/P EST MOD 30 MIN: CPT | Performed by: UROLOGY

## 2023-07-25 RX ORDER — TAMSULOSIN HYDROCHLORIDE 0.4 MG/1
0.4 CAPSULE ORAL DAILY
Qty: 90 CAPSULE | Refills: 3 | Status: SHIPPED | OUTPATIENT
Start: 2023-07-25 | End: 2023-10-23

## 2023-07-25 NOTE — PROGRESS NOTES
Cassi Arevalo MD.    [address]  [address] [address]  Dept: [phone]  Dept Fax: [phone]: [address] U[facility] Office Note -     Patient:  Denilson Neal  YOB: 1965    The patient is a 62 y.o. male who presents today for evaluation of the following problems: =  Chief Complaint   Patient presents with    Results     Review bx results     referred/consultation requested by Tom Saunders DO. History of Present Illness:    Prostate cancer  Low grade new diagnosis  Brother and father with pca    BPH  Worsening  45 g  Previously Auass 9 mostly satisfied    ED  Sildenafil PRN      Requested/reviewed records from Tom Saunders DO office and/or outside physician/EMR    (Patient's old records have been requested, reviewed and pertinent findings summarized in today's note.)    Procedures Today: N/A      Last several PSA's:  Lab Results   Component Value Date    PSA 4.13 (H) 10/12/2022    PSA 2.81 (H) 10/04/2021    PSA 3.68 (H) 04/05/2021       Last total testosterone:  No results found for: TESTOSTERONE    Urinalysis today:  No results found for this visit on 07/25/23. Last BUN and creatinine:  Lab Results   Component Value Date    BUN 15 10/12/2022     Lab Results   Component Value Date    CREATININE 0.9 10/12/2022         Imaging Reviewed during this Office Visit:   Kathy Saleh MD independently reviewed the images and verified the radiology reports from:      imaging independently reviewed by Kathy Saleh MD and radiology report verified demonstrating     MRI PROSTATE W WO CONTRAST    Result Date: 5/30/2023  PROCEDURE: MRI PROSTATE W WO CONTRAST CLINICAL INFORMATION: Elevated PSA COMPARISON: None TECHNIQUE: Axial T2, coronal T2 and sagittal T2 imaging of the prostate gland. Large field of view axial T2 imaging of the prostate gland.  Axial T1, axial T1 VIBE dynamic post alexis and

## 2023-07-28 ENCOUNTER — SOCIAL WORK (OUTPATIENT)
Dept: INFUSION THERAPY | Age: 58
End: 2023-07-28

## 2023-07-28 NOTE — PROGRESS NOTES
Oncology Social Work    Date: 7/28/2023  Time: 12:56 PM  Name: Terry Ashton  MRN: [id number]     Contact Type: Follow-up    Note:   Situation: This staff called Terry Ashton (goes by Gallo Miguel) via phone support to introduce myself as his Oncology Social Worker. Background:  Gallo Miguel had been given his Distress Thermometer at his recent appointment with the Urologist. This staff was calling to review the results of his distress levels. Assessment: Although there were minimal concerns indicated on the Distress Screening, this staff reviewed the services provided by the SW as part of his care team.  - Since no immediate needs were referenced by Gallo Miguel at this time, he went on to share the experience of loosing his dad 1 1/2 yrs ago to prostate cancer. He also share, his girlfriend is a nurse and would like him to go to [facility] for a 2nd opinion, which he is considering. He was asked to keep us current since the follow-up appt for him isn't for another 6 months. He agreed to the request.   - No further referral was made since Gallo Miguel has pretty well completed his treatment program at this time     Recommendation: Follow-up will be initiated by Gallo Miguel based on need.  provided him with my contact information and will remain available for support.         SAUL Shell, WILLIAM, ARTHUR  Oncology Social Worker      Electronically signed by SAUL Shell LSW, ARTHUR on 7/28/2023 at 12:56 PM

## 2023-10-16 ENCOUNTER — NURSE ONLY (OUTPATIENT)
Dept: LAB | Age: 58
End: 2023-10-16

## 2023-10-16 DIAGNOSIS — E78.00 PURE HYPERCHOLESTEROLEMIA: ICD-10-CM

## 2023-10-16 DIAGNOSIS — I10 ESSENTIAL HYPERTENSION: ICD-10-CM

## 2023-10-16 DIAGNOSIS — R73.01 IFG (IMPAIRED FASTING GLUCOSE): ICD-10-CM

## 2023-10-16 LAB
ALBUMIN SERPL BCG-MCNC: 4.6 G/DL (ref 3.5–5.1)
ALP SERPL-CCNC: 86 U/L (ref 38–126)
ALT SERPL W/O P-5'-P-CCNC: 25 U/L (ref 11–66)
ANION GAP SERPL CALC-SCNC: 11 MEQ/L (ref 8–16)
AST SERPL-CCNC: 20 U/L (ref 5–40)
BASOPHILS ABSOLUTE: 0 THOU/MM3 (ref 0–0.1)
BASOPHILS NFR BLD AUTO: 0.3 %
BILIRUB SERPL-MCNC: 0.3 MG/DL (ref 0.3–1.2)
BUN SERPL-MCNC: 18 MG/DL (ref 7–22)
CALCIUM SERPL-MCNC: 10 MG/DL (ref 8.5–10.5)
CHLORIDE SERPL-SCNC: 102 MEQ/L (ref 98–111)
CHOLEST SERPL-MCNC: 228 MG/DL (ref 100–199)
CO2 SERPL-SCNC: 29 MEQ/L (ref 23–33)
CREAT SERPL-MCNC: 1 MG/DL (ref 0.4–1.2)
DEPRECATED MEAN GLUCOSE BLD GHB EST-ACNC: 108 MG/DL (ref 70–126)
DEPRECATED RDW RBC AUTO: 40.4 FL (ref 35–45)
EOSINOPHIL NFR BLD AUTO: 0.6 %
EOSINOPHILS ABSOLUTE: 0 THOU/MM3 (ref 0–0.4)
ERYTHROCYTE [DISTWIDTH] IN BLOOD BY AUTOMATED COUNT: 12.4 % (ref 11.5–14.5)
GFR SERPL CREATININE-BSD FRML MDRD: > 60 ML/MIN/1.73M2
GLUCOSE SERPL-MCNC: 98 MG/DL (ref 70–108)
HBA1C MFR BLD HPLC: 5.6 % (ref 4.4–6.4)
HCT VFR BLD AUTO: 45.1 % (ref 42–52)
HDLC SERPL-MCNC: 48 MG/DL
HGB BLD-MCNC: 14.8 GM/DL (ref 14–18)
IMM GRANULOCYTES # BLD AUTO: 0.03 THOU/MM3 (ref 0–0.07)
IMM GRANULOCYTES NFR BLD AUTO: 0.4 %
LDLC SERPL CALC-MCNC: 106 MG/DL
LYMPHOCYTES ABSOLUTE: 2.2 THOU/MM3 (ref 1–4.8)
LYMPHOCYTES NFR BLD AUTO: 28.4 %
MCH RBC QN AUTO: 29.6 PG (ref 26–33)
MCHC RBC AUTO-ENTMCNC: 32.8 GM/DL (ref 32.2–35.5)
MCV RBC AUTO: 90.2 FL (ref 80–94)
MONOCYTES ABSOLUTE: 0.6 THOU/MM3 (ref 0.4–1.3)
MONOCYTES NFR BLD AUTO: 8.1 %
NEUTROPHILS NFR BLD AUTO: 62.2 %
NRBC BLD AUTO-RTO: 0 /100 WBC
PLATELET # BLD AUTO: 287 THOU/MM3 (ref 130–400)
PMV BLD AUTO: 10.6 FL (ref 9.4–12.4)
POTASSIUM SERPL-SCNC: 4.6 MEQ/L (ref 3.5–5.2)
PROT SERPL-MCNC: 7.5 G/DL (ref 6.1–8)
PSA SERPL-MCNC: 4 NG/ML (ref 0–1)
RBC # BLD AUTO: 5 MILL/MM3 (ref 4.7–6.1)
SEGMENTED NEUTROPHILS ABSOLUTE COUNT: 4.8 THOU/MM3 (ref 1.8–7.7)
SODIUM SERPL-SCNC: 142 MEQ/L (ref 135–145)
TRIGL SERPL-MCNC: 371 MG/DL (ref 0–199)
TSH SERPL DL<=0.005 MIU/L-ACNC: 2.31 UIU/ML (ref 0.4–4.2)
WBC # BLD AUTO: 7.7 THOU/MM3 (ref 4.8–10.8)

## 2023-10-24 ENCOUNTER — OFFICE VISIT (OUTPATIENT)
Dept: FAMILY MEDICINE CLINIC | Age: 58
End: 2023-10-24
Payer: COMMERCIAL

## 2023-10-24 VITALS
DIASTOLIC BLOOD PRESSURE: 86 MMHG | WEIGHT: 219.2 LBS | RESPIRATION RATE: 16 BRPM | SYSTOLIC BLOOD PRESSURE: 130 MMHG | HEART RATE: 88 BPM | BODY MASS INDEX: 32.37 KG/M2

## 2023-10-24 DIAGNOSIS — I10 ESSENTIAL HYPERTENSION: Primary | ICD-10-CM

## 2023-10-24 DIAGNOSIS — C61 PROSTATE CANCER (HCC): ICD-10-CM

## 2023-10-24 DIAGNOSIS — N52.9 ERECTILE DYSFUNCTION, UNSPECIFIED ERECTILE DYSFUNCTION TYPE: ICD-10-CM

## 2023-10-24 DIAGNOSIS — E78.00 PURE HYPERCHOLESTEROLEMIA: ICD-10-CM

## 2023-10-24 DIAGNOSIS — E66.9 OBESITY (BMI 30-39.9): ICD-10-CM

## 2023-10-24 PROCEDURE — 3079F DIAST BP 80-89 MM HG: CPT | Performed by: FAMILY MEDICINE

## 2023-10-24 PROCEDURE — 3075F SYST BP GE 130 - 139MM HG: CPT | Performed by: FAMILY MEDICINE

## 2023-10-24 PROCEDURE — 99214 OFFICE O/P EST MOD 30 MIN: CPT | Performed by: FAMILY MEDICINE

## 2023-10-24 RX ORDER — SILDENAFIL 100 MG/1
100 TABLET, FILM COATED ORAL DAILY PRN
Qty: 30 TABLET | Refills: 2 | Status: SHIPPED | OUTPATIENT
Start: 2023-10-24

## 2023-10-24 ASSESSMENT — PATIENT HEALTH QUESTIONNAIRE - PHQ9
SUM OF ALL RESPONSES TO PHQ QUESTIONS 1-9: 0
2. FEELING DOWN, DEPRESSED OR HOPELESS: 0
SUM OF ALL RESPONSES TO PHQ9 QUESTIONS 1 & 2: 0
SUM OF ALL RESPONSES TO PHQ QUESTIONS 1-9: 0
1. LITTLE INTEREST OR PLEASURE IN DOING THINGS: 0

## 2023-10-24 ASSESSMENT — ENCOUNTER SYMPTOMS
GASTROINTESTINAL NEGATIVE: 1
RESPIRATORY NEGATIVE: 1

## 2023-10-24 NOTE — PROGRESS NOTES
Disp-30 tablet, R-2Normal  Pure hypercholesterolemia  Obesity (BMI 30-39. 9)  Prostate cancer ([address])    -  Chronic medical problems stable  -  Labs reviewed, look fine overall  -  Continue current medications  -  Follow up with specialists as scheduled    Return in about 6 months (around 4/24/2024) for HTN.         --Kye Avina, DO

## 2024-01-01 DIAGNOSIS — I10 ESSENTIAL HYPERTENSION: ICD-10-CM

## 2024-01-01 DIAGNOSIS — E78.00 PURE HYPERCHOLESTEROLEMIA: ICD-10-CM

## 2024-01-02 RX ORDER — ATORVASTATIN CALCIUM 20 MG/1
20 TABLET, FILM COATED ORAL NIGHTLY
Qty: 90 TABLET | Refills: 0 | Status: SHIPPED | OUTPATIENT
Start: 2024-01-02

## 2024-01-02 RX ORDER — AMLODIPINE BESYLATE 5 MG/1
TABLET ORAL
Qty: 90 TABLET | Refills: 0 | Status: SHIPPED | OUTPATIENT
Start: 2024-01-02

## 2024-04-24 ENCOUNTER — OFFICE VISIT (OUTPATIENT)
Dept: FAMILY MEDICINE CLINIC | Age: 59
End: 2024-04-24
Payer: COMMERCIAL

## 2024-04-24 VITALS
SYSTOLIC BLOOD PRESSURE: 118 MMHG | BODY MASS INDEX: 32.5 KG/M2 | HEART RATE: 68 BPM | RESPIRATION RATE: 16 BRPM | DIASTOLIC BLOOD PRESSURE: 76 MMHG | WEIGHT: 220.1 LBS

## 2024-04-24 DIAGNOSIS — E66.9 OBESITY (BMI 30-39.9): ICD-10-CM

## 2024-04-24 DIAGNOSIS — C61 PROSTATE CANCER (HCC): ICD-10-CM

## 2024-04-24 DIAGNOSIS — R73.01 IFG (IMPAIRED FASTING GLUCOSE): ICD-10-CM

## 2024-04-24 DIAGNOSIS — N52.9 ERECTILE DYSFUNCTION, UNSPECIFIED ERECTILE DYSFUNCTION TYPE: ICD-10-CM

## 2024-04-24 DIAGNOSIS — I10 ESSENTIAL HYPERTENSION: Primary | ICD-10-CM

## 2024-04-24 DIAGNOSIS — E78.00 PURE HYPERCHOLESTEROLEMIA: ICD-10-CM

## 2024-04-24 PROCEDURE — G2211 COMPLEX E/M VISIT ADD ON: HCPCS | Performed by: FAMILY MEDICINE

## 2024-04-24 PROCEDURE — 99214 OFFICE O/P EST MOD 30 MIN: CPT | Performed by: FAMILY MEDICINE

## 2024-04-24 PROCEDURE — 3078F DIAST BP <80 MM HG: CPT | Performed by: FAMILY MEDICINE

## 2024-04-24 PROCEDURE — 3074F SYST BP LT 130 MM HG: CPT | Performed by: FAMILY MEDICINE

## 2024-04-24 RX ORDER — SILDENAFIL 100 MG/1
100 TABLET, FILM COATED ORAL DAILY PRN
Qty: 30 TABLET | Refills: 2 | Status: SHIPPED | OUTPATIENT
Start: 2024-04-24

## 2024-04-24 SDOH — ECONOMIC STABILITY: FOOD INSECURITY: WITHIN THE PAST 12 MONTHS, THE FOOD YOU BOUGHT JUST DIDN'T LAST AND YOU DIDN'T HAVE MONEY TO GET MORE.: NEVER TRUE

## 2024-04-24 SDOH — ECONOMIC STABILITY: FOOD INSECURITY: WITHIN THE PAST 12 MONTHS, YOU WORRIED THAT YOUR FOOD WOULD RUN OUT BEFORE YOU GOT MONEY TO BUY MORE.: NEVER TRUE

## 2024-04-24 SDOH — ECONOMIC STABILITY: INCOME INSECURITY: HOW HARD IS IT FOR YOU TO PAY FOR THE VERY BASICS LIKE FOOD, HOUSING, MEDICAL CARE, AND HEATING?: NOT HARD AT ALL

## 2024-04-24 ASSESSMENT — PATIENT HEALTH QUESTIONNAIRE - PHQ9
SUM OF ALL RESPONSES TO PHQ QUESTIONS 1-9: 0
1. LITTLE INTEREST OR PLEASURE IN DOING THINGS: NOT AT ALL
SUM OF ALL RESPONSES TO PHQ QUESTIONS 1-9: 0
2. FEELING DOWN, DEPRESSED OR HOPELESS: NOT AT ALL
SUM OF ALL RESPONSES TO PHQ QUESTIONS 1-9: 0
SUM OF ALL RESPONSES TO PHQ QUESTIONS 1-9: 0
SUM OF ALL RESPONSES TO PHQ9 QUESTIONS 1 & 2: 0

## 2024-04-24 ASSESSMENT — ENCOUNTER SYMPTOMS
RESPIRATORY NEGATIVE: 1
GASTROINTESTINAL NEGATIVE: 1

## 2024-04-24 NOTE — PROGRESS NOTES
Martell Quijano (:  1965) is a 58 y.o. male,Established patient, here for evaluation of the following chief complaint(s):  6 Month Follow-Up (HTN) and Medication Refill          Subjective   SUBJECTIVE/OBJECTIVE:  HPI  Chief Complaint   Patient presents with    6 Month Follow-Up     HTN    Medication Refill     6 month eval.      Doing well overall.    BP Readings from Last 3 Encounters:   24 118/76   10/24/23 130/86   23 132/70     Wt Readings from Last 3 Encounters:   24 99.8 kg (220 lb 1.6 oz)   10/24/23 99.4 kg (219 lb 3.2 oz)   23 98.4 kg (217 lb)     Prostate cancer, active surveillance.  Scheduled for MRI and PSA with Urology.        Patient Active Problem List   Diagnosis    Hyperlipidemia    HTN (hypertension)    Obesity    ED (erectile dysfunction)    Family history of prostate cancer    Numbness    Lateral epicondylitis of right elbow    Carpal tunnel syndrome of right wrist    Malignant neoplasm of prostate (HCC)       Current Outpatient Medications   Medication Sig Dispense Refill    sildenafil (VIAGRA) 100 MG tablet Take 1 tablet by mouth daily as needed for Erectile Dysfunction 30 tablet 2    amLODIPine (NORVASC) 5 MG tablet Take 1 tablet by mouth once daily 90 tablet 0    atorvastatin (LIPITOR) 20 MG tablet Take 1 tablet by mouth nightly 90 tablet 0     No current facility-administered medications for this visit.       Past Surgical History:   Procedure Laterality Date    WISDOM TOOTH EXTRACTION  2018       Review of Systems   Constitutional: Negative.    HENT: Negative.     Respiratory: Negative.     Cardiovascular: Negative.    Gastrointestinal: Negative.    Musculoskeletal: Negative.    All other systems reviewed and are negative.         Objective   Physical Exam  Vitals and nursing note reviewed.   Constitutional:       General: He is not in acute distress.     Appearance: Normal appearance. He is well-developed.   HENT:      Head: Normocephalic and atraumatic.

## 2024-07-01 DIAGNOSIS — E78.00 PURE HYPERCHOLESTEROLEMIA: ICD-10-CM

## 2024-07-01 DIAGNOSIS — I10 ESSENTIAL HYPERTENSION: ICD-10-CM

## 2024-07-01 RX ORDER — ATORVASTATIN CALCIUM 20 MG/1
20 TABLET, FILM COATED ORAL NIGHTLY
Qty: 90 TABLET | Refills: 3 | Status: SHIPPED | OUTPATIENT
Start: 2024-07-01

## 2024-07-01 RX ORDER — AMLODIPINE BESYLATE 5 MG/1
TABLET ORAL
Qty: 90 TABLET | Refills: 3 | Status: SHIPPED | OUTPATIENT
Start: 2024-07-01

## 2024-10-15 ENCOUNTER — LAB (OUTPATIENT)
Dept: LAB | Age: 59
End: 2024-10-15

## 2024-10-15 DIAGNOSIS — E78.00 PURE HYPERCHOLESTEROLEMIA: ICD-10-CM

## 2024-10-15 DIAGNOSIS — I10 ESSENTIAL HYPERTENSION: ICD-10-CM

## 2024-10-15 DIAGNOSIS — R73.01 IFG (IMPAIRED FASTING GLUCOSE): ICD-10-CM

## 2024-10-15 LAB
ALBUMIN SERPL BCG-MCNC: 4.2 G/DL (ref 3.5–5.1)
ALP SERPL-CCNC: 92 U/L (ref 38–126)
ALT SERPL W/O P-5'-P-CCNC: 22 U/L (ref 11–66)
ANION GAP SERPL CALC-SCNC: 12 MEQ/L (ref 8–16)
AST SERPL-CCNC: 21 U/L (ref 5–40)
BASOPHILS ABSOLUTE: 0 THOU/MM3 (ref 0–0.1)
BASOPHILS NFR BLD AUTO: 0.2 %
BILIRUB SERPL-MCNC: 0.4 MG/DL (ref 0.3–1.2)
BUN SERPL-MCNC: 17 MG/DL (ref 7–22)
CALCIUM SERPL-MCNC: 9.4 MG/DL (ref 8.5–10.5)
CHLORIDE SERPL-SCNC: 104 MEQ/L (ref 98–111)
CHOLEST SERPL-MCNC: 200 MG/DL (ref 100–199)
CO2 SERPL-SCNC: 27 MEQ/L (ref 23–33)
CREAT SERPL-MCNC: 0.9 MG/DL (ref 0.4–1.2)
DEPRECATED MEAN GLUCOSE BLD GHB EST-ACNC: 117 MG/DL (ref 70–126)
DEPRECATED RDW RBC AUTO: 39.6 FL (ref 35–45)
EOSINOPHIL NFR BLD AUTO: 1.6 %
EOSINOPHILS ABSOLUTE: 0.1 THOU/MM3 (ref 0–0.4)
ERYTHROCYTE [DISTWIDTH] IN BLOOD BY AUTOMATED COUNT: 12.4 % (ref 11.5–14.5)
GFR SERPL CREATININE-BSD FRML MDRD: > 90 ML/MIN/1.73M2
GLUCOSE SERPL-MCNC: 99 MG/DL (ref 70–108)
HBA1C MFR BLD HPLC: 5.9 % (ref 4.4–6.4)
HCT VFR BLD AUTO: 41.3 % (ref 42–52)
HDLC SERPL-MCNC: 49 MG/DL
HGB BLD-MCNC: 13.8 GM/DL (ref 14–18)
IMM GRANULOCYTES # BLD AUTO: 0.02 THOU/MM3 (ref 0–0.07)
IMM GRANULOCYTES NFR BLD AUTO: 0.3 %
LDLC SERPL CALC-MCNC: 97 MG/DL
LYMPHOCYTES ABSOLUTE: 1.4 THOU/MM3 (ref 1–4.8)
LYMPHOCYTES NFR BLD AUTO: 23.1 %
MCH RBC QN AUTO: 29.1 PG (ref 26–33)
MCHC RBC AUTO-ENTMCNC: 33.4 GM/DL (ref 32.2–35.5)
MCV RBC AUTO: 87.1 FL (ref 80–94)
MONOCYTES ABSOLUTE: 0.6 THOU/MM3 (ref 0.4–1.3)
MONOCYTES NFR BLD AUTO: 9.8 %
NEUTROPHILS ABSOLUTE: 4 THOU/MM3 (ref 1.8–7.7)
NEUTROPHILS NFR BLD AUTO: 65 %
NRBC BLD AUTO-RTO: 0 /100 WBC
PLATELET # BLD AUTO: 266 THOU/MM3 (ref 130–400)
PMV BLD AUTO: 10.9 FL (ref 9.4–12.4)
POTASSIUM SERPL-SCNC: 4.4 MEQ/L (ref 3.5–5.2)
PROT SERPL-MCNC: 7.3 G/DL (ref 6.1–8)
PSA SERPL-MCNC: 2.94 NG/ML (ref 0–1)
RBC # BLD AUTO: 4.74 MILL/MM3 (ref 4.7–6.1)
SODIUM SERPL-SCNC: 143 MEQ/L (ref 135–145)
TRIGL SERPL-MCNC: 269 MG/DL (ref 0–199)
TSH SERPL DL<=0.005 MIU/L-ACNC: 1.87 UIU/ML (ref 0.4–4.2)
WBC # BLD AUTO: 6.2 THOU/MM3 (ref 4.8–10.8)

## 2024-10-24 ENCOUNTER — OFFICE VISIT (OUTPATIENT)
Dept: FAMILY MEDICINE CLINIC | Age: 59
End: 2024-10-24

## 2024-10-24 VITALS
RESPIRATION RATE: 16 BRPM | HEART RATE: 84 BPM | WEIGHT: 225 LBS | DIASTOLIC BLOOD PRESSURE: 88 MMHG | SYSTOLIC BLOOD PRESSURE: 130 MMHG | BODY MASS INDEX: 33.33 KG/M2 | HEIGHT: 69 IN

## 2024-10-24 DIAGNOSIS — N52.9 ERECTILE DYSFUNCTION, UNSPECIFIED ERECTILE DYSFUNCTION TYPE: ICD-10-CM

## 2024-10-24 DIAGNOSIS — R73.01 IFG (IMPAIRED FASTING GLUCOSE): ICD-10-CM

## 2024-10-24 DIAGNOSIS — I10 ESSENTIAL HYPERTENSION: Primary | ICD-10-CM

## 2024-10-24 DIAGNOSIS — E66.9 OBESITY (BMI 30-39.9): ICD-10-CM

## 2024-10-24 DIAGNOSIS — C61 PROSTATE CANCER (HCC): ICD-10-CM

## 2024-10-24 DIAGNOSIS — E78.00 PURE HYPERCHOLESTEROLEMIA: ICD-10-CM

## 2024-10-24 RX ORDER — SILDENAFIL 100 MG/1
100 TABLET, FILM COATED ORAL DAILY PRN
Qty: 30 TABLET | Refills: 2 | Status: SHIPPED | OUTPATIENT
Start: 2024-10-24

## 2024-10-24 ASSESSMENT — ENCOUNTER SYMPTOMS
RESPIRATORY NEGATIVE: 1
GASTROINTESTINAL NEGATIVE: 1

## 2024-10-24 NOTE — PROGRESS NOTES
Martell Quijano (:  1965) is a 59 y.o. male,Established patient, here for evaluation of the following chief complaint(s):  6 Month Follow-Up (HTN) and Medication Refill          Subjective   SUBJECTIVE/OBJECTIVE:  HPI  Chief Complaint   Patient presents with    6 Month Follow-Up     HTN    Medication Refill     6 month eval.    Doing well, no concerns.    Following periodically with Urology for prostate cancer.  Active surveillance.    BP Readings from Last 3 Encounters:   10/24/24 130/88   24 118/76   10/24/23 130/86     Wt Readings from Last 3 Encounters:   10/24/24 102.1 kg (225 lb)   24 99.8 kg (220 lb 1.6 oz)   10/24/23 99.4 kg (219 lb 3.2 oz)       Patient Active Problem List   Diagnosis    Hyperlipidemia    HTN (hypertension)    Obesity    ED (erectile dysfunction)    Family history of prostate cancer    Numbness    Lateral epicondylitis of right elbow    Carpal tunnel syndrome of right wrist    Malignant neoplasm of prostate (HCC)       Current Outpatient Medications   Medication Sig Dispense Refill    sildenafil (VIAGRA) 100 MG tablet Take 1 tablet by mouth daily as needed for Erectile Dysfunction 30 tablet 2    atorvastatin (LIPITOR) 20 MG tablet Take 1 tablet by mouth nightly 90 tablet 3    amLODIPine (NORVASC) 5 MG tablet Take 1 tablet by mouth once daily 90 tablet 3     No current facility-administered medications for this visit.       Past Surgical History:   Procedure Laterality Date    WISDOM TOOTH EXTRACTION  2018       Review of Systems   Constitutional: Negative.    HENT: Negative.     Respiratory: Negative.     Cardiovascular: Negative.    Gastrointestinal: Negative.    Musculoskeletal: Negative.    All other systems reviewed and are negative.         Objective   Physical Exam  Vitals and nursing note reviewed.   Constitutional:       General: He is not in acute distress.     Appearance: Normal appearance. He is well-developed.   HENT:      Head: Normocephalic and

## 2025-04-14 ENCOUNTER — OFFICE VISIT (OUTPATIENT)
Dept: FAMILY MEDICINE CLINIC | Age: 60
End: 2025-04-14

## 2025-04-14 VITALS
HEART RATE: 80 BPM | SYSTOLIC BLOOD PRESSURE: 124 MMHG | HEIGHT: 69 IN | BODY MASS INDEX: 33.72 KG/M2 | RESPIRATION RATE: 18 BRPM | DIASTOLIC BLOOD PRESSURE: 76 MMHG | WEIGHT: 227.7 LBS

## 2025-04-14 DIAGNOSIS — E66.9 OBESITY (BMI 30-39.9): ICD-10-CM

## 2025-04-14 DIAGNOSIS — N52.9 ERECTILE DYSFUNCTION, UNSPECIFIED ERECTILE DYSFUNCTION TYPE: ICD-10-CM

## 2025-04-14 DIAGNOSIS — E78.00 PURE HYPERCHOLESTEROLEMIA: ICD-10-CM

## 2025-04-14 DIAGNOSIS — R73.01 IFG (IMPAIRED FASTING GLUCOSE): ICD-10-CM

## 2025-04-14 DIAGNOSIS — C61 PROSTATE CANCER (HCC): ICD-10-CM

## 2025-04-14 DIAGNOSIS — I10 ESSENTIAL HYPERTENSION: Primary | ICD-10-CM

## 2025-04-14 RX ORDER — ATORVASTATIN CALCIUM 20 MG/1
20 TABLET, FILM COATED ORAL NIGHTLY
Qty: 90 TABLET | Refills: 3 | Status: SHIPPED | OUTPATIENT
Start: 2025-04-14

## 2025-04-14 RX ORDER — AMLODIPINE BESYLATE 5 MG/1
TABLET ORAL
Qty: 90 TABLET | Refills: 3 | Status: SHIPPED | OUTPATIENT
Start: 2025-04-14

## 2025-04-14 RX ORDER — SILDENAFIL 100 MG/1
100 TABLET, FILM COATED ORAL DAILY PRN
Qty: 30 TABLET | Refills: 2 | Status: SHIPPED | OUTPATIENT
Start: 2025-04-14

## 2025-04-14 SDOH — ECONOMIC STABILITY: FOOD INSECURITY: WITHIN THE PAST 12 MONTHS, YOU WORRIED THAT YOUR FOOD WOULD RUN OUT BEFORE YOU GOT MONEY TO BUY MORE.: NEVER TRUE

## 2025-04-14 SDOH — ECONOMIC STABILITY: FOOD INSECURITY: WITHIN THE PAST 12 MONTHS, THE FOOD YOU BOUGHT JUST DIDN'T LAST AND YOU DIDN'T HAVE MONEY TO GET MORE.: NEVER TRUE

## 2025-04-14 ASSESSMENT — PATIENT HEALTH QUESTIONNAIRE - PHQ9
SUM OF ALL RESPONSES TO PHQ QUESTIONS 1-9: 0
2. FEELING DOWN, DEPRESSED OR HOPELESS: NOT AT ALL
SUM OF ALL RESPONSES TO PHQ QUESTIONS 1-9: 0
1. LITTLE INTEREST OR PLEASURE IN DOING THINGS: NOT AT ALL

## 2025-04-14 ASSESSMENT — ENCOUNTER SYMPTOMS
GASTROINTESTINAL NEGATIVE: 1
RESPIRATORY NEGATIVE: 1

## 2025-04-14 NOTE — PROGRESS NOTES
Martell Quijano (:  1965) is a 59 y.o. male,Established patient, here for evaluation of the following chief complaint(s):  6 Month Follow-Up and Hypertension          Subjective   SUBJECTIVE/OBJECTIVE:  HPI  Chief Complaint   Patient presents with    6 Month Follow-Up    Hypertension     6 month eval.    Doing well, no acute concerns.    BP Readings from Last 3 Encounters:   25 124/76   10/24/24 130/88   24 118/76     Wt Readings from Last 3 Encounters:   25 103.3 kg (227 lb 11.2 oz)   10/24/24 102.1 kg (225 lb)   24 99.8 kg (220 lb 1.6 oz)       Patient Active Problem List   Diagnosis    Hyperlipidemia    HTN (hypertension)    Obesity    ED (erectile dysfunction)    Family history of prostate cancer    Numbness    Lateral epicondylitis of right elbow    Carpal tunnel syndrome of right wrist    Malignant neoplasm of prostate (HCC)       Current Outpatient Medications   Medication Sig Dispense Refill    sildenafil (VIAGRA) 100 MG tablet Take 1 tablet by mouth daily as needed for Erectile Dysfunction 30 tablet 2    atorvastatin (LIPITOR) 20 MG tablet Take 1 tablet by mouth nightly 90 tablet 3    amLODIPine (NORVASC) 5 MG tablet Take 1 tablet by mouth once daily 90 tablet 3     No current facility-administered medications for this visit.       Past Surgical History:   Procedure Laterality Date    WISDOM TOOTH EXTRACTION  2018       Review of Systems   Constitutional: Negative.    HENT: Negative.     Respiratory: Negative.     Cardiovascular: Negative.    Gastrointestinal: Negative.    Musculoskeletal: Negative.    All other systems reviewed and are negative.         Objective   Physical Exam  Vitals and nursing note reviewed.   Constitutional:       General: He is not in acute distress.     Appearance: Normal appearance. He is well-developed.   HENT:      Head: Normocephalic and atraumatic.      Right Ear: Tympanic membrane normal.      Left Ear: Tympanic membrane normal.   Eyes:

## 2025-05-09 ENCOUNTER — LAB (OUTPATIENT)
Dept: LAB | Age: 60
End: 2025-05-09

## 2025-05-09 LAB — PSA SERPL-MCNC: 4.46 NG/ML (ref 0–1)

## 2025-06-09 ENCOUNTER — LAB (OUTPATIENT)
Dept: LAB | Age: 60
End: 2025-06-09

## 2025-06-09 LAB — PSA SERPL-MCNC: 4.03 NG/ML (ref 0–1)
